# Patient Record
Sex: FEMALE | Race: WHITE | Employment: FULL TIME | URBAN - METROPOLITAN AREA
[De-identification: names, ages, dates, MRNs, and addresses within clinical notes are randomized per-mention and may not be internally consistent; named-entity substitution may affect disease eponyms.]

---

## 2017-07-26 ENCOUNTER — OFFICE VISIT (OUTPATIENT)
Dept: ORTHOPEDIC SURGERY | Age: 22
End: 2017-07-26

## 2017-07-26 VITALS
OXYGEN SATURATION: 99 % | TEMPERATURE: 98 F | HEART RATE: 92 BPM | BODY MASS INDEX: 32.52 KG/M2 | WEIGHT: 207.2 LBS | RESPIRATION RATE: 17 BRPM | DIASTOLIC BLOOD PRESSURE: 79 MMHG | SYSTOLIC BLOOD PRESSURE: 117 MMHG | HEIGHT: 67 IN

## 2017-07-26 DIAGNOSIS — M25.572 ACUTE LEFT ANKLE PAIN: ICD-10-CM

## 2017-07-26 DIAGNOSIS — M25.562 ACUTE PAIN OF LEFT KNEE: ICD-10-CM

## 2017-07-26 DIAGNOSIS — S83.92XA SPRAIN OF LEFT KNEE, UNSPECIFIED LIGAMENT, INITIAL ENCOUNTER: ICD-10-CM

## 2017-07-26 DIAGNOSIS — S93.412A SPRAIN OF CALCANEOFIBULAR LIGAMENT OF LEFT ANKLE, INITIAL ENCOUNTER: Primary | ICD-10-CM

## 2017-07-26 NOTE — PROGRESS NOTES
Chief Complaint   Patient presents with    Knee Pain     Left    Ankle Pain     Left    Hip Pain     Right

## 2017-07-26 NOTE — LETTER
NOTIFICATION RETURN TO WORK / SCHOOL 
 
7/26/2017 1:24 PM 
 
Ms. Mary Briscoe 
[address] [facility] [place] [address] [address] To Whom It May Concern: 
 
Kaylyn Rivas is currently under the care of [address] [place] [address] Pepito Navarro. She was seen in our office today. Please excuse her from work today, 7-26-17. She will return to work to work on 7-28-17 with the following restrictions: 
 Sedentary work only until her follow up appointment. If there are questions or concerns please have the patient contact our office.  
 
 
 
Sincerely, 
 
 
Brianna Marmolejo MD

## 2017-07-26 NOTE — PROCEDURES
RADIOGRAPHS: X-rays of the left knee, AP, lateral: No gross effusion. No fracture, subluxation, or dislocation. The medial and lateral joint spaces are well maintained. X-rays of the left ankle, AP, lateral, and oblique: Ankle joints are well maintained. No osteolytic or osteoblastic lesions are seen. No osteochondral defects. No significant bone spurs are seen to her foot, dorsal, plantar, medial or lateral. Normal alignment seen.

## 2017-07-26 NOTE — PROGRESS NOTES
AMBULATORY PROGRESS NOTE      Patient: Remberto Paredes             MRN: [id number]     SSN: [id number] Body mass index is 32.45 kg/(m^2). YOB: 1995     AGE: 22 y.o. EX: female    PCP: Alicia Page MD    IMPRESSION/DIAGNOSIS AND TREATMENT PLAN     DIAGNOSES  1. Sprain of calcaneofibular ligament of left ankle, initial encounter    2. Acute left ankle pain    3. Acute pain of left knee    4. Sprain of left knee, unspecified ligament, initial encounter        Orders Placed This Encounter    AMB SUPPLY ORDER    AMB SUPPLY ORDER    [56474] Knee 1-2V    [43089] Ankle Min 3V      Remberto Paredes understands her diagnoses and the proposed plan. Plan:    1) Order Custom Left Hinged Knee Brace  2) Order Left AS/AC Brace for Left Ankle    RTO - 3 weeks    HPI AND EXAMINATION     Mary Briscoe IS A 22 y.o. female who presents to my outpatient office complaining of left knee pain, right hip pain, and left ankle pain. On July 22, 2017, she was shopping at [employer] and stepped on a tomato that caused her to fall where she injured her right knee and ankle. 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 She rates her left sided pain at 5 to 8/10. Ms. Daniella Gamez reports not have any other complications. She works as a  at [employer]. Appearance: Alert, well appearing and pleasant patient who is in no distress, oriented to person, place/time, and who follows commands. This patient is accompanied in the office by her  self. Psychiatric: Affect and mood are appropriate.    Cardiovascular/Peripheral Vascular: Normal Pulses to each hand and foot           Knees:  Right        Gait: slow         Cutaneous: Skin intact, no abrasions, blisters, wounds, erythema        Effusion: Is not present        Crepitus:  no PF joint crepitus       Tenderness: Mild joint tenderness to MCL region   Alignment of Knee: no neutral when standing        ROM: full range of motion        Fullness or swelling: None to popliteal fossa region        Stability: No instability to anterior, posterior, varus, valgus stress testing        Trust: No varus trust with gait        Contractures: No Achilles or Gastrocnemius Contractures. Calf tenderness: Absent for calf or gastrocnemius muscle regions            Soft, supple, non tender, non taut lower extremity compartments  Extremities:   No embolic phenomena to the toes          No significant edema to the foot and or toes. Edema is not present to distal 1/3 tib/fib or ankle regions. Lower extremities are warm and appear well perfused    DVT: No evidence of DVT seen on examination at this time     No calf swelling, no tenderness to calf muscles  Lymphatic:  No Evidence of Lymphedema  Vascular: Medial Border of Tibia Region: Edema is not present        Pulses: Dorsalis Pedis &  Posterior Tibial Pulses : Palpable yes        Varicosities Lower Limbs : None noted . Neuro: Negative bilateral Straight leg raise (seated position)    See Musculoskeletal section for pertinent individual extremity examination    No abnormal hand/wrist, foot/ankle, or facial/neck tremors. Extensor mechanism is intact    HIP REGION: both  Gait: slow   Cutaneous: Skin intact, redness not present, erythema not present,drainage not present , rash not present  Visible swelling: not present  ROM: Normal IR, Normal ER   Normal FLEXION, Normal EXTENSION   Stability: none noted  Effusion: difficult to assess due to soft tissues  Crepitus: mild PF joint crepitus  Tenderness: to Groin not present, GT region not present, to distal femur not present   Fullness: Popliteal region not present  Deformity: not present       Contractures: No Achilles or Gastrocnemius Contractures.         Calf tenderness: Absent for calf or gastrocnemius muscle regions            Soft, supple, non tender, non taut lower extremity compartments  Extremities:   No embolic phenomena to the toes          No significant edema to the foot and or toes. Edema is not present to distal 1/3 tib/fib or ankle regions. Lower extremities are warm and appear well perfused    DVT: No evidence of DVT seen on examination at this time     No calf swelling, no tenderness to calf muscles  Lymphatic:  No Evidence of Lymphedema  Vascular: Medial Border of Tibia Region: Edema is not present        Pulses: Dorsalis Pedis &  Posterior Tibial Pulses : Palpable yes        Varicosities Lower Limbs : None noted . Neuro: Negative bilateral Straight leg raise (seated position)    See Musculoskeletal section for pertinent individual extremity examination    No abnormal hand/wrist, foot/ankle, or facial/neck tremors. ANKLE/FOOT left    Psychiatry: Alert, Oriented x 3; Speech normal in context and clarity,            Memory intact grossly, no involuntary movements - tremors, no dementia  Gait: slow  Tenderness: Tenderness to the left posterior tibial tendon and ATFL  Cutaneous: Mild lateral ankle swelling  Joint Motion: Decreased  Joint / Tendon Stability: No Ankle or Subtalar instability or joint laxity. No peroneal sublux ability or dislocation  Alignment:  Normal Foot Alignment and  Flexible  Neuro Motor/Sensory: NL/NL  Vascular: NL foot/ankle pulses  Lymphatics: No extremity lymphedema, No calf swelling, no tenderness to calf muscles. CHART REVIEW     History reviewed. No pertinent past medical history. Current Outpatient Prescriptions   Medication Sig    levonorgestrel (MIRENA) 20 mcg/24 hr (5 years) IUD 1 Each by IntraUTERine route once.  ibuprofen (MOTRIN) 800 mg tablet Take 1 Tab by mouth every eight (8) hours as needed for Pain. No current facility-administered medications for this visit. Allergies   Allergen Reactions    Ciprofloxacin Shortness of Breath     History reviewed. No pertinent surgical history.   Social History     Occupational History    Not on file. Social History Main Topics    Smoking status: Never Smoker    Smokeless tobacco: Never Used    Alcohol use Yes    Drug use: No    Sexual activity: Yes     History reviewed. No pertinent family history. REVIEW OF SYSTEMS : 8/30/2017  ALL BELOW ARE Negative except : SEE HPI     CONSTITUTIONAL: denies chills, fatigue, fever, weight change   PSYCH: denies anxiety, depression, irritability or mood swings   ENT: denies - headaches, hearing change, nasal congestion, oral lesions, or sore throat   HEM/ONC denies - bleeding problems, bruising, pallor or swollen lymph nodes   ENDO: denies hot flashes, polydipsia/polyuria or temperature intolerance   RESP: denies - cough, shortness of breath or wheezing   CV: denies - chest pain, edema or palpitations, BLAIR  GI: denies - abdominal pain, change in bowel habits, constipation, diarrhea or nausea/vomiting   : denies - dysuria, hematuria, incontinence, pelvic pain   MSK: denies  - See HPI. NEURO: denies - confusion, headaches, seizures or weakness   DERM: denies - dry skin, hair changes, rash or skin lesion changes  VASCULAR: Peripheral Vascular: No calf pain, vascular vein tenderness to calf pain              No calf throbbing, posterior knee throbbing pain     DIAGNOSTIC IMAGING     RADIOGRAPHS: X-rays of the left knee, AP, lateral: No gross effusion. No fracture, subluxation, or dislocation. The medial and lateral joint spaces are well maintained. X-rays of the left ankle, AP, lateral, and oblique: Ankle joints are well maintained. No osteolytic or osteoblastic lesions are seen. No osteochondral defects. No significant bone spurs are seen to her foot, dorsal, plantar, medial or lateral. Normal alignment seen. Written by Demetris Jaeger, as dictated by Delano Cobb MD. Dr. LONDON Victorino Covert, MD, confirm that all documentation is accurate.

## 2017-07-26 NOTE — PATIENT INSTRUCTIONS
Please follow up in 3 weeks. You are advised to contact us if your condition worsens. The provider has ordered a custom brace/orthotic for you. This will be customized and made for you by an outside facility. Please contact the orthotist at one of the below offices for your custom fitting and follow up in the office with the doctor or his physicians assistant 3 weeks after you have been wearing the brace. Nga Antonio at [facility]:     [address] [place] [address]   Phone: [phone]     Foot Sprain: Care Instructions  Your Care Instructions    A foot sprain occurs when you stretch or tear the ligaments around your foot. Ligaments are the tough tissues that connect one bone to another. A sprain can happen when you run, fall, or hit your toe against something. Sprains often happen when you jump or change direction quickly. This may occur when you play basketball, soccer, or other sports. Most foot sprains will get better with treatment at home. Follow-up care is a key part of your treatment and safety. Be sure to make and go to all appointments, and call your doctor if you are having problems. It's also a good idea to know your test results and keep a list of the medicines you take. How can you care for yourself at home? · Walk or put weight on your sprained foot as long as it does not hurt. · If your doctor gave you a splint or immobilizer, wear it as directed. If you were given crutches, use them as directed. · For the first 2 days after your injury, avoid hot showers, hot tubs, or hot packs. They may increase swelling. · Put ice or a cold pack on your foot for 10 to 20 minutes at a time to stop swelling. Try this every 1 to 2 hours for 3 days (when you are awake) or until the swelling goes down. Put a thin cloth between the ice pack and your skin. Keep your splint dry.   · After 2 or 3 days, if your swelling is gone, put a heating pad (set on low) or a warm cloth on your foot. Some doctors suggest that you go back and forth between hot and cold treatments. · Prop up your foot on a pillow when you ice it or anytime you sit or lie down. Try to keep it above the level of your heart. This will help reduce swelling. · Take pain medicines exactly as directed. ¨ If the doctor gave you a prescription medicine for pain, take it as prescribed. ¨ If you are not taking a prescription pain medicine, ask your doctor if you can take an over-the-counter medicine. · Do any exercises that your doctor or physical therapist suggests. · Return to your usual exercise gradually as you feel better. When should you call for help? Call your doctor now or seek immediate medical care if:  · You have increased or severe pain. · Your toes are cool or pale or change color. · Your wrap or splint feels too tight. · You have signs of a blood clot, such as:  ¨ Pain in your calf, back of the knee, thigh, or groin. ¨ Redness and swelling in your leg or groin. · You have tingling, weakness, or numbness in your leg or foot. Watch closely for changes in your health, and be sure to contact your doctor if:  · You cannot put any weight on your foot. · You get a fever. · You do not get better as expected. Where can you learn more? Go to http://fabian-zeny.info/. Enter C276 in the search box to learn more about \"Foot Sprain: Care Instructions. \"  Current as of: March 21, 2017  Content Version: 11.3  © 3875-5368 [employer]. Care instructions adapted under license by [employer] (which disclaims liability or warranty for this information). If you have questions about a medical condition or this instruction, always ask your healthcare professional. [place] [address] any warranty or liability for your use of this information. Ankle Sprain: Care Instructions  Your Care Instructions    An ankle sprain can happen when you twist your ankle.  The ligaments that support the ankle can get stretched and torn. Often the ankle is swollen and painful. Ankle sprains may take from several weeks to several months to heal. Usually, the more pain and swelling you have, the more severe your ankle sprain is and the longer it will take to heal. You can heal faster and regain strength in your ankle with good home treatment. It is very important to give your ankle time to heal completely, so that you do not easily hurt your ankle again. Follow-up care is a key part of your treatment and safety. Be sure to make and go to all appointments, and call your doctor if you are having problems. It's also a good idea to know your test results and keep a list of the medicines you take. How can you care for yourself at home? · Prop up your foot on pillows as much as possible for the next 3 days. Try to keep your ankle above the level of your heart. This will help reduce the swelling. · Follow your doctor's directions for wearing a splint or elastic bandage. Wrapping the ankle may help reduce or prevent swelling. · Your doctor may give you a splint, a brace, an air stirrup, or another form of ankle support to protect your ankle until it is healed. Wear it as directed while your ankle is healing. Do not remove it unless your doctor tells you to. After your ankle has healed, ask your doctor whether you should wear the brace when you exercise. · Put ice or cold packs on your injured ankle for 10 to 20 minutes at a time. Try to do this every 1 to 2 hours for the next 3 days (when you are awake) or until the swelling goes down. Put a thin cloth between the ice and your skin. · You may need to use crutches until you can walk without pain. If you do use crutches, try to bear some weight on your injured ankle if you can do so without pain. This helps the ankle heal.  · Take pain medicines exactly as directed.   ¨ If the doctor gave you a prescription medicine for pain, take it as prescribed. ¨ If you are not taking a prescription pain medicine, ask your doctor if you can take an over-the-counter medicine. · If you have been given ankle exercises to do at home, do them exactly as instructed. These can promote healing and help prevent lasting weakness. When should you call for help? Call your doctor now or seek immediate medical care if:  · Your pain is getting worse. · Your swelling is getting worse. · Your splint feels too tight or you are unable to loosen it. Watch closely for changes in your health, and be sure to contact your doctor if:  · You are not getting better after 1 week. Where can you learn more? Go to http://fabian-zeny.info/. Enter O141 in the search box to learn more about \"Ankle Sprain: Care Instructions. \"  Current as of: March 21, 2017  Content Version: 11.3  © 7892-0700 [employer]. Care instructions adapted under license by [employer] (which disclaims liability or warranty for this information). If you have questions about a medical condition or this instruction, always ask your healthcare professional. [address] any warranty or liability for your use of this information.

## 2017-08-30 ENCOUNTER — OFFICE VISIT (OUTPATIENT)
Dept: ORTHOPEDIC SURGERY | Age: 22
End: 2017-08-30

## 2017-08-30 VITALS
RESPIRATION RATE: 16 BRPM | BODY MASS INDEX: 32.3 KG/M2 | OXYGEN SATURATION: 99 % | HEIGHT: 67 IN | DIASTOLIC BLOOD PRESSURE: 78 MMHG | TEMPERATURE: 96.7 F | WEIGHT: 205.8 LBS | HEART RATE: 79 BPM | SYSTOLIC BLOOD PRESSURE: 121 MMHG

## 2017-08-30 DIAGNOSIS — S93.412D SPRAIN OF CALCANEOFIBULAR LIGAMENT OF LEFT ANKLE, SUBSEQUENT ENCOUNTER: ICD-10-CM

## 2017-08-30 DIAGNOSIS — S83.002D SUBLUXATION OF PATELLA, LEFT, SUBSEQUENT ENCOUNTER: Primary | ICD-10-CM

## 2017-08-30 NOTE — MR AVS SNAPSHOT
Visit Information Date & Time Provider Department Dept. Phone Encounter #  
 8/30/2017  8:00 AM Blaine Singh, [address] [facility] [phone] [id number] Upcoming Health Maintenance Date Due Hepatitis A Peds Age 1-18 (1 of 2 - Standard Series) 9/5/1997 DTaP/Tdap/Td series (1 - Tdap) 9/5/2003 HPV AGE 9Y-26Y (1 of 3 - Female 3 Dose Series) 9/5/2007 Pneumococcal 19-64 Medium Risk (1 of 1 - PPSV23) 9/5/2015 INFLUENZA AGE 9 TO ADULT 8/1/2017 Allergies as of 8/30/2017  Review Complete On: 8/30/2017 By: Blaine Singh MD  
  
 Severity Noted Reaction Type Reactions Ciprofloxacin  02/17/2015    Shortness of Breath Current Immunizations  Never Reviewed No immunizations on file. Not reviewed this visit You Were Diagnosed With   
  
 Codes Comments Subluxation of patella, left, subsequent encounter    -  Primary ICD-10-CM: I05.692P ICD-9-CM: V54.89, 836.3 Sprain of calcaneofibular ligament of left ankle, subsequent encounter     ICD-10-CM: X73.009E ICD-9-CM: V58.89, 845.02 Vitals BP Pulse Temp Resp Height(growth percentile) Weight(growth percentile) 121/78 79 96.7 °F (35.9 °C) (Oral) 16 5' 7\" (1.702 m) 205 lb 12.8 oz (93.4 kg) SpO2 BMI OB Status Smoking Status 99% 32.23 kg/m2 IUD Never Smoker BMI and BSA Data Body Mass Index Body Surface Area  
 32.23 kg/m 2 2.1 m 2 Preferred Pharmacy Pharmacy Name Phone [facility] [address] [place] [address], [address] [place] [address] [phone] Your Updated Medication List  
  
   
This list is accurate as of: 8/30/17  8:28 AM.  Always use your most recent med list.  
  
  
  
  
 ibuprofen 800 mg tablet Commonly known as:  MOTRIN Take 1 Tab by mouth every eight (8) hours as needed for Pain.  
  
 levonorgestrel 20 mcg/24 hr (5 years) IUD Commonly known as:  MIRENA  
1 Each by IntraUTERine route once. Patient Instructions Please follow up after MRI. You are advised to contact us if your condition worsens. An MRI or CT has been ordered for you. A [employer] will be contacting you to schedule the appointment as soon as it has been approved with your insurance company. Please schedule an appointment to follow up with the doctor or the physicians assistant after the MRI or CT has been conducted. Knee: Exercises Your Care Instructions Here are some examples of exercises for your knee. Start each exercise slowly. Ease off the exercise if you start to have pain. Your doctor or physical therapist will tell you when you can start these exercises and which ones will work best for you. How to do the exercises [facility] 1. Sit with your leg straight and supported on the floor or a firm bed. (If you feel discomfort in the front or back of your knee, place a small towel roll under your knee.) 2. Tighten the muscles on top of your thigh by pressing the back of your knee flat down to the floor. (If you feel discomfort under your kneecap, place a small towel roll under your knee.) 3. Hold for about 6 seconds, then rest for up to 10 seconds. 4. Do 8 to 12 repetitions several times a day. Straight-leg raises to the front 1. Lie on your back with your good knee bent so that your foot rests flat on the floor. Your injured leg should be straight. Make sure that your low back has a normal curve. You should be able to slip your flat hand in between the floor and the small of your back, with your palm touching the floor and your back touching the back of your hand. 2. Tighten the thigh muscles in the injured leg by pressing the back of your knee flat down to the floor. Hold your knee straight. 3. Keeping the thigh muscles tight, lift your injured leg up so that your heel is about 12 inches off the floor. Hold for about 6 seconds and then lower slowly. 4. Do 8 to 12 repetitions, 3 times a day. Straight-leg raises to the outside 1. Lie on your side, with your injured leg on top. 2. Tighten the front thigh muscles of your injured leg to keep your knee straight. 3. Keep your hip and your leg straight in line with the rest of your body, and keep your knee pointing forward. Do not drop your hip back. 4. Lift your injured leg straight up toward the ceiling, about 12 inches off the floor. Hold for about 6 seconds, then slowly lower your leg. 5. Do 8 to 12 repetitions. Straight-leg raises to the back 1. Lie on your stomach, and lift your leg straight up behind you (toward the ceiling). 2. Lift your toes about 6 inches off the floor, hold for about 6 seconds, then lower slowly. 3. Do 8 to 12 repetitions. Straight-leg raises to the inside 1. Lie on the side of your body with the injured leg. 2. You can either prop your other (good) leg up on a chair, or you can bend your good knee and put that foot in front of your injured knee. Do not drop your hip back. 3. Tighten the muscles on the front of your thigh to straighten your injured knee. 4. Keep your kneecap pointing forward, and lift your whole leg up toward the ceiling about 6 inches. Hold for about 6 seconds, then lower slowly. 5. Do 8 to 12 repetitions. Heel dig bridging 1. Lie on your back with both knees bent and your ankles bent so that only your heels are digging into the floor. Your knees should be bent about 90 degrees. 2. Then push your heels into the floor, squeeze your buttocks, and lift your hips off the floor until your shoulders, hips, and knees are all in a straight line. 3. Hold for about 6 seconds as you continue to breathe normally, and then slowly lower your hips back down to the floor and rest for up to 10 seconds. 4. Do 8 to 12 repetitions. Hamstring curls 1. Lie on your stomach with your knees straight. If your kneecap is uncomfortable, roll up a washcloth and put it under your leg just above your kneecap. 2. Lift the foot of your injured leg by bending the knee so that you bring the foot up toward your buttock. If this motion hurts, try it without bending your knee quite as far. This may help you avoid any painful motion. 3. Slowly lower your leg back to the floor. 4. Do 8 to 12 repetitions. 5. With permission from your doctor or physical therapist, you may also want to add a cuff weight to your ankle (not more than 5 pounds). With weight, you do not have to lift your leg more than 12 inches to get a hamstring workout. Shallow standing knee bends Note: Do this exercise only if you have very little pain; if you have no clicking, locking, or giving way if you have an injured knee; and if it does not hurt while you are doing 8 to 12 repetitions. 1. Stand with your hands lightly resting on a counter or chair in front of you. Put your feet shoulder-width apart. 2. Slowly bend your knees so that you squat down like you are going to sit in a chair. Make sure your knees do not go in front of your toes. 3. Lower yourself about 6 inches. Your heels should remain on the floor at all times. 4. Rise slowly to a standing position. Heel raises 1. Stand with your feet a few inches apart, with your hands lightly resting on a counter or chair in front of you. 2. Slowly raise your heels off the floor while keeping your knees straight. 3. Hold for about 6 seconds, then slowly lower your heels to the floor. 4. Do 8 to 12 repetitions several times during the day. Follow-up care is a key part of your treatment and safety. Be sure to make and go to all appointments, and call your doctor if you are having problems. It's also a good idea to know your test results and keep a list of the medicines you take. Where can you learn more? Go to http://fabian-zeny.info/. Enter K456 in the search box to learn more about \"Knee: Exercises. \" Current as of: March 21, 2017 Content Version: 11.3 © 4418-2565 Healthwise, [employer]. Care instructions adapted under license by [employer] (which disclaims liability or warranty for this information). If you have questions about a medical condition or this instruction, always ask your healthcare professional. [address] any warranty or liability for your use of this information. Introducing [facility]! Carie Dhillon introduces [employer] patient portal. Now you can access parts of your medical record, email your doctor's office, and request medication refills online. 1. In your internet browser, go to [email or web address] 2. Click on the First Time User? Click Here link in the Sign In box. You will see the New Member Sign Up page. 3. Enter your [employer] Access Code exactly as it appears below. You will not need to use this code after youve completed the sign-up process. If you do not sign up before the expiration date, you must request a new code. · [employer] Access Code: [email or web address] Expires: 10/24/2017 11:03 AM 
 
4. Enter the last four digits of your Social Security Number (xxxx) and Date of Birth (mm/dd/yyyy) as indicated and click Submit. You will be taken to the next sign-up page. 5. Create a [employer] ID. This will be your [employer] login ID and cannot be changed, so think of one that is secure and easy to remember. 6. Create a [employer] password. You can change your password at any time. 7. Enter your Password Reset Question and Answer. This can be used at a later time if you forget your password. 8. Enter your e-mail address. You will receive e-mail notification when new information is available in [address] 9. Click Sign Up. You can now view and download portions of your medical record. 10. Click the Download Summary menu link to download a portable copy of your medical information.  
 
If you have questions, please visit the Frequently Asked Questions section of the [employer]. Remember, [facility] is NOT to be used for urgent needs. For medical emergencies, dial 911. Now available from your iPhone and Android! Please provide this summary of care documentation to your next provider. Your primary care clinician is listed as Dayanna Almanza. If you have any questions after today's visit, please call [phone].

## 2017-08-30 NOTE — LETTER
NOTIFICATION RETURN TO WORK / SCHOOL 
 
8/30/2017 8:24 AM 
 
Ms. Mary Briscoe 
[address] [facility] [address] [address] To Whom It May Concern: 
 
Kari August is currently under the care of [address] [place] [address] Pepito Navarro. Please allow Ms. Ellis Padilla to return to work on [employer] for 4 weeks with a limit to lift up to 15 pounds. If there are questions or concerns please have the patient contact our office.  
 
 
 
Sincerely, 
 
 
Jeffery Robin MD

## 2017-08-30 NOTE — PATIENT INSTRUCTIONS
Please follow up after MRI. You are advised to contact us if your condition worsens. An MRI or CT has been ordered for you. A [employer] will be contacting you to schedule the appointment as soon as it has been approved with your insurance company. Please schedule an appointment to follow up with the doctor or the physicians assistant after the MRI or CT has been conducted. Knee: Exercises  Your Care Instructions  Here are some examples of exercises for your knee. Start each exercise slowly. Ease off the exercise if you start to have pain. Your doctor or physical therapist will tell you when you can start these exercises and which ones will work best for you. How to do the exercises  Quad sets    1. Sit with your leg straight and supported on the floor or a firm bed. (If you feel discomfort in the front or back of your knee, place a small towel roll under your knee.)  2. Tighten the muscles on top of your thigh by pressing the back of your knee flat down to the floor. (If you feel discomfort under your kneecap, place a small towel roll under your knee.)  3. Hold for about 6 seconds, then rest for up to 10 seconds. 4. Do 8 to 12 repetitions several times a day. Straight-leg raises to the front    1. Lie on your back with your good knee bent so that your foot rests flat on the floor. Your injured leg should be straight. Make sure that your low back has a normal curve. You should be able to slip your flat hand in between the floor and the small of your back, with your palm touching the floor and your back touching the back of your hand. 2. Tighten the thigh muscles in the injured leg by pressing the back of your knee flat down to the floor. Hold your knee straight. 3. Keeping the thigh muscles tight, lift your injured leg up so that your heel is about 12 inches off the floor. Hold for about 6 seconds and then lower slowly. 4. Do 8 to 12 repetitions, 3 times a day.   Straight-leg raises to the outside    1. Lie on your side, with your injured leg on top. 2. Tighten the front thigh muscles of your injured leg to keep your knee straight. 3. Keep your hip and your leg straight in line with the rest of your body, and keep your knee pointing forward. Do not drop your hip back. 4. Lift your injured leg straight up toward the ceiling, about 12 inches off the floor. Hold for about 6 seconds, then slowly lower your leg. 5. Do 8 to 12 repetitions. Straight-leg raises to the back    1. Lie on your stomach, and lift your leg straight up behind you (toward the ceiling). 2. Lift your toes about 6 inches off the floor, hold for about 6 seconds, then lower slowly. 3. Do 8 to 12 repetitions. Straight-leg raises to the inside    1. Lie on the side of your body with the injured leg. 2. You can either prop your other (good) leg up on a chair, or you can bend your good knee and put that foot in front of your injured knee. Do not drop your hip back. 3. Tighten the muscles on the front of your thigh to straighten your injured knee. 4. Keep your kneecap pointing forward, and lift your whole leg up toward the ceiling about 6 inches. Hold for about 6 seconds, then lower slowly. 5. Do 8 to 12 repetitions. Heel dig bridging    1. Lie on your back with both knees bent and your ankles bent so that only your heels are digging into the floor. Your knees should be bent about 90 degrees. 2. Then push your heels into the floor, squeeze your buttocks, and lift your hips off the floor until your shoulders, hips, and knees are all in a straight line. 3. Hold for about 6 seconds as you continue to breathe normally, and then slowly lower your hips back down to the floor and rest for up to 10 seconds. 4. Do 8 to 12 repetitions. Hamstring curls    1. Lie on your stomach with your knees straight. If your kneecap is uncomfortable, roll up a washcloth and put it under your leg just above your kneecap.   2. Lift the foot of your injured leg by bending the knee so that you bring the foot up toward your buttock. If this motion hurts, try it without bending your knee quite as far. This may help you avoid any painful motion. 3. Slowly lower your leg back to the floor. 4. Do 8 to 12 repetitions. 5. With permission from your doctor or physical therapist, you may also want to add a cuff weight to your ankle (not more than 5 pounds). With weight, you do not have to lift your leg more than 12 inches to get a hamstring workout. Shallow standing knee bends    Note: Do this exercise only if you have very little pain; if you have no clicking, locking, or giving way if you have an injured knee; and if it does not hurt while you are doing 8 to 12 repetitions. 1. Stand with your hands lightly resting on a counter or chair in front of you. Put your feet shoulder-width apart. 2. Slowly bend your knees so that you squat down like you are going to sit in a chair. Make sure your knees do not go in front of your toes. 3. Lower yourself about 6 inches. Your heels should remain on the floor at all times. 4. Rise slowly to a standing position. Heel raises    1. Stand with your feet a few inches apart, with your hands lightly resting on a counter or chair in front of you. 2. Slowly raise your heels off the floor while keeping your knees straight. 3. Hold for about 6 seconds, then slowly lower your heels to the floor. 4. Do 8 to 12 repetitions several times during the day. Follow-up care is a key part of your treatment and safety. Be sure to make and go to all appointments, and call your doctor if you are having problems. It's also a good idea to know your test results and keep a list of the medicines you take. Where can you learn more? Go to http://fabian-zeny.info/. Enter G045 in the search box to learn more about \"Knee: Exercises. \"  Current as of: March 21, 2017  Content Version: 11.3  © 9392-0367 [employer], [facility].  Care instructions adapted under license by [employer] (which disclaims liability or warranty for this information). If you have questions about a medical condition or this instruction, always ask your healthcare professional. [address] any warranty or liability for your use of this information.

## 2017-08-30 NOTE — PROGRESS NOTES
AMBULATORY PROGRESS NOTE      Patient: Al July             MRN: [id number]     SSN: [id number] Body mass index is 32.23 kg/(m^2). YOB: 1995     AGE: 22 y.o. EX: female    PCP: Ovi Koch MD    IMPRESSION/DIAGNOSIS AND TREATMENT PLAN     DIAGNOSES  1. Subluxation of patella, left, subsequent encounter    2. Sprain of calcaneofibular ligament of left ankle, subsequent encounter        Orders Placed This Encounter    MRI KNEE LT [address] [address] understands her diagnoses and the proposed plan. As such, in this individual, who is a 41-year-old, healthy female, who had a work-related, twisting injury to her left knee, who has medial joint line tenderness and a positive Trina's sign and has discomfort when she goes up and down steps and discomfort when she stands and walks for any prolonged period of time, recommendation strongly is for an MRI of her left knee. This MRI of the left knee is definitely an item of medical necessity. This will allow me to assess the medial meniscus, as well as the MCL, but more importantly, the meniscal capsular junction, is what I am looking for to assess on this left knee. Plan:    1) D/C Left Hinged Knee Brace and Left Ankle AS/AC Brace  2) Recommended OTC Knee Brace  3) Provided Theraband for Left Ankle  4) MRI - Left Knee  5) Work Note Provided for Light Duty    RTO - After MRI    HPI AND EXAMINATION     Mary Briscoe IS A 22 y.o. female who presents to my outpatient office for follow up of a sprain of calcaneofibular ligament of the left ankle, acute left knee pain, and a sprain of the left knee. On July 22, 2017, she was shopping at [employer] and stepped on a tomato that caused her to fall where she injured her right knee and ankle. 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  At last visit, I ordered a custom left hinged knee brace, and a left AS/AC brace for the left ankle. My overall impression is subluxation of the left patella with a history of left patellofemoral injury in the past while in high school, as well as a work-related ankle sprain. Other diagnoses include [id number], posterior tibial tendinitis on this left side, as well as a left knee sprain, left patellofemoral syndrome, left medial joint line tenderness, and left ankle sprain resolving, and left posterior tibial tendinitis, improved. All are work-related injuries. The patient presents to the office today wearing sandals, her left knee brace, and the left AS/AC brace. She notes there is pain on the medial side of her knee. Pt has not noticed any swelling. Pt denies any elicited pain when going up or down steps. She reports that there is some crepitus when flexing and extending the left knee. She notes some pain when crossing her left leg over, no pain when crossing the right leg. Treatment options and mechanism of injury have been discussed with the Pt. She understands the options provided and is contempt with the options. Appearance: Alert, well appearing and pleasant patient who is in no distress, oriented to person, place/time, and who follows commands. This patient is accompanied in the office by her  self. Psychiatric: Affect and mood are appropriate. Cardiovascular/Peripheral Vascular: Normal Pulses to each hand and foot      Knees:  Right                         Gait: slow                          Cutaneous: Skin intact, no abrasions, blisters, wounds, erythema                        Effusion: Is not present                        Crepitus:  no PF joint crepitus                        Tenderness: Mild joint tenderness to Medial joint line/MCL region, Trina +.                         Alignment of Knee: no neutral when standing                        ROM: full range of motion                        Fullness or swelling: None to popliteal fossa region Stability: No instability to anterior, posterior, varus, valgus stress testing                        Trust: No varus trust with gait                        Contractures: No Achilles or Gastrocnemius Contractures. Calf tenderness: Absent for calf or gastrocnemius muscle regions                                 Soft, supple, non tender, non taut lower extremity compartments  Extremities:   No embolic phenomena to the toes                                               No significant edema to the foot and or toes. Edema is not present to distal 1/3 tib/fib or ankle regions. Lower extremities are warm and appear well perfused                                              DVT: No evidence of DVT seen on examination at this time                                                                    No calf swelling, no tenderness to calf muscles  Lymphatic:  No Evidence of Lymphedema  Vascular: Medial Border of Tibia Region: Edema is not present                             Pulses: Dorsalis Pedis &  Posterior Tibial Pulses : Palpable yes                             Varicosities Lower Limbs : None noted . Neuro: Negative bilateral Straight leg raise (seated position)                         See Musculoskeletal section for pertinent individual extremity examination                         No abnormal hand/wrist, foot/ankle, or facial/neck tremors. Extensor mechanism is intact     ANKLE/FOOT left     Psychiatry: Alert, Oriented x 3; Speech normal in context and clarity, Memory intact grossly, no involuntary movements - tremors, no dementia  Gait: slow  Tenderness: Mild Tenderness to the left posterior tibial tendon and ATFL  Cutaneous: Mild, mild  lateral ankle swelling AL  Joint Motion: WNL  Joint / Tendon Stability: No Ankle or Subtalar instability or joint laxity. No peroneal sublux ability or dislocation  Alignment:  Normal Foot Alignment and  Flexible  Neuro Motor/Sensory: NL/NL  Vascular: NL foot/ankle pulses  Lymphatics: No extremity lymphedema, No calf swelling, no tenderness to calf muscles. CHART REVIEW     History reviewed. No pertinent past medical history. Current Outpatient Prescriptions   Medication Sig    levonorgestrel (MIRENA) 20 mcg/24 hr (5 years) IUD 1 Each by IntraUTERine route once.  ibuprofen (MOTRIN) 800 mg tablet Take 1 Tab by mouth every eight (8) hours as needed for Pain. No current facility-administered medications for this visit. Allergies   Allergen Reactions    Ciprofloxacin Shortness of Breath     History reviewed. No pertinent surgical history. Social History     Occupational History    Not on file. Social History Main Topics    Smoking status: Never Smoker    Smokeless tobacco: Never Used    Alcohol use Yes    Drug use: No    Sexual activity: Yes     History reviewed. No pertinent family history. REVIEW OF SYSTEMS : 8/30/2017  ALL BELOW ARE Negative except : SEE HPI       Constitutional: Negative for fever, chills and weight loss. Neg Weigh Loss  Cardiovascular: Negative for chest pain, claudication and leg swelling. SOB, BLAIR   Gastrointestinal: Negative for  pain, N/V/D/C, Blood in stool or urine,dysuria, hematuria,        Incontinence, pelvic pain  Musculoskeletal: see HPI. Neurological: Negative for dizziness and weakness. Negative for headaches,Visual Changes, Confusion, Seizures,   Psychiatric/Behavioral: Negative for depression, memory loss and substance abuse. Extremities:  Negative for  hair changes, rash or skin lesion changes. Hematologic: Negative for Bleeding problems, bruising, pallor or swollen lymph nodes.   Peripheral Vascular: No calf pain, vascular vein tenderness to calf pain              No calf throbbing, posterior knee throbbing pain    DIAGNOSTIC IMAGING     No notes on file    Written by Hector Onofre, as dictated by Erin Naranjo MD. I, , Erin Naranjo MD, confirm that all documentation is accurate.

## 2017-09-08 ENCOUNTER — HOSPITAL ENCOUNTER (OUTPATIENT)
Age: 22
Discharge: HOME | End: 2017-09-08
Attending: ORTHOPAEDIC SURGERY
Payer: COMMERCIAL

## 2017-09-08 DIAGNOSIS — S83.002D SUBLUXATION OF PATELLA, LEFT, SUBSEQUENT ENCOUNTER: ICD-10-CM

## 2017-09-08 PROCEDURE — 73721 MRI JNT OF LWR EXTRE W/O DYE: CPT

## 2017-10-02 ENCOUNTER — HOSPITAL ENCOUNTER (OUTPATIENT)
Dept: PHYSICAL THERAPY | Age: 22
Discharge: HOME | End: 2017-10-02
Payer: COMMERCIAL

## 2017-10-02 ENCOUNTER — OFFICE VISIT (OUTPATIENT)
Dept: ORTHOPEDIC SURGERY | Age: 22
End: 2017-10-02

## 2017-10-02 VITALS
TEMPERATURE: 97.1 F | WEIGHT: 209 LBS | BODY MASS INDEX: 32.8 KG/M2 | HEART RATE: 83 BPM | SYSTOLIC BLOOD PRESSURE: 127 MMHG | DIASTOLIC BLOOD PRESSURE: 68 MMHG | HEIGHT: 67 IN

## 2017-10-02 DIAGNOSIS — M76.822 POSTERIOR TIBIAL TENDINITIS OF LEFT LOWER EXTREMITY: Primary | ICD-10-CM

## 2017-10-02 DIAGNOSIS — M25.562 LEFT KNEE PAIN, UNSPECIFIED CHRONICITY: ICD-10-CM

## 2017-10-02 PROCEDURE — 97110 THERAPEUTIC EXERCISES: CPT

## 2017-10-02 PROCEDURE — 97161 PT EVAL LOW COMPLEX 20 MIN: CPT

## 2017-10-02 NOTE — PROGRESS NOTES
[facility]  [address]  [place], [address]  [phone] fax    Plan of Care/ Statement of Necessity for Physical Therapy Services    Patient name: Amber Burleson Start of Care: 10/2/2017   Referral source: Carla Singh MD : 1995    Medical Diagnosis: Left knee pain [M25.562]   Onset Date: 2017    Treatment Diagnosis: L quad tendinosis   Prior Hospitalization: see medical history Provider#: [id number]   Medications: Verified on Patient summary List    Comorbidities: Arthritis, Latex Allergy   Prior Level of Function: The patient states that she had L knee pain in a chronic standpoint, but noted it became worse following the fall on her knee in July limiting stairs and squatting. The Plan of Care and following information is based on the information from the initial evaluation. Assessment/ key information: The patient is a 25year old female with a chief complaint of left knee and ankle pain that initiated in July when she stated she slipped on a tomato that was on a the floor and fell on her left knee. She states her ankle has also been sore. About 3 weeks ago, she noted falling again due to her ankle (L) giving way and she heard a pop and it became swollen. Since that time, she has been taking it easy per MD instruction, She has had radiographs which were negative, and a recent MRI of her L knee which showed quad tendinosis with associated impairments consisting of pain, decreased ROM, decreased flexibility, decreased strength, and limited ADL ease. The patient will benefit from skilled PT in order to address the aforementioned impairments.      Evaluation Complexity History MEDIUM  Complexity : 1-2 comorbidities / personal factors will impact the outcome/ POC ; Examination MEDIUM Complexity : 3 Standardized tests and measures addressing body structure, function, activity limitation and / or participation in recreation ;Presentation LOW Complexity : Stable, uncomplicated  ;Clinical Decision Making MEDIUM Complexity : FOTO score of 26-74  Overall Complexity Rating: LOW   Problem List: pain affecting function, decrease strength, decrease ADL/ functional abilitiies, decrease activity tolerance and decrease flexibility/ joint mobility   Treatment Plan may include any combination of the following: Therapeutic exercise, Therapeutic activities, Neuromuscular re-education, Physical agent/modality, Manual therapy and Patient education  Patient / Family readiness to learn indicated by: asking questions, trying to perform skills and interest  Persons(s) to be included in education: patient (P)  Barriers to Learning/Limitations: None  Patient Goal (s): strengthen knee  Patient Self Reported Health Status: good  Rehabilitation Potential: good    Short Term Goals: To be accomplished in 2 weeks:   1. The patient will be independent and compliant with HEP to maximize therapeutic benefit. 2. The patient will improve quad strength to 5/5 MMT to maximize stability during ADLs. Long Term Goals: To be accomplished in 4 weeks:   1. The patient will improve FOTO score to 60 to maximize quality of life. 2. The patient will display 8\" step up without evidence of instability or compensation for community negotiation. 3. The patient will display effective squat form with no greater than 1/10 pain in order to improve work efficiency. 4. The patient will improve hip ABD/ER hip strength to 4+/5 MMT to improve stability of knee during squatting to reduce knee pain. Frequency / Duration: Patient to be seen 2 times per week for 4 weeks.     Patient/ Caregiver education and instruction: Diagnosis, prognosis, self care, activity modification and exercises   [x]  Plan of care has been reviewed with ROSENDO Brunner, PT 10/2/2017 12:47 PM    ________________________________________________________________________    I certify that the above Therapy Services are being furnished while the patient is under my care. I agree with the treatment plan and certify that this therapy is necessary.     [de-identified] Signature:____________________  Date:____________Time: _________    Please sign and return to [facility] [address] [address] [address]  [place], [address]  [phone] fax

## 2017-10-02 NOTE — PROGRESS NOTES
AMBULATORY PROGRESS NOTE      Patient: Sherryle Marsh             MRN: [id number]     SSN: [id number] Body mass index is 32.73 kg/(m^2). YOB: 1995     AGE: 25 y.o. EX: female    PCP: Eloina Olea MD    IMPRESSION/DIAGNOSIS AND TREATMENT PLAN     DIAGNOSES  1. Posterior tibial tendinitis of left lower extremity    2. Left knee pain, unspecified chronicity        Orders Placed This Encounter    REFERRAL TO PHYSICAL THERAPY    [address] [place] [facility]      Sherryle Marsh understands her diagnoses and the proposed plan. Plan:    1) Referral for Physical Therapy: PF instability, Quadricept tendonopathy  2) Continue HEP  3) Referral Dr. Rizwana Rosas: please look at MRI: to my viewing: ACL looks diminutive  4) Work Note: Light Duty, no lifting or pulling until further notice. RTO - 3 weeks     HPI AND EXAMINATION     Mary Briscoe IS A 25 y.o. female who presents to my outpatient office for follow up of an injury to the left knee. At last visit, I recommended to D/C the left hinged knee brace, use OTC Knee brace, and ordered an MRI. The patient presents to the office today for follow up of her left knee MRI. She reports that she has felt the knee pop. The patient notes some instability to the left knee. The patient reports that on 9/1/2017, she twisted her left ankle while unloading a van. She notes that she felt a pop, and pain along the inner portion of her left foot. She visited [facility] where XR were requested. XR were negative for fractures according to the patient. She experiences discomfort, and pain along the medial portion of the left ankle. She points to the posterior tibialis tendon. She has an orthotics that were provided earlier, she notes that they have helped. Appearance: Alert, well appearing and pleasant patient who is in no distress, oriented to person, place/time, and who follows commands. This patient is accompanied in the office by her  self. Psychiatric: Affect and mood are appropriate. Cardiovascular/Peripheral Vascular: Normal Pulses to each hand and foot      Knees:  LEFT                         Gait: slow                          Cutaneous: Skin intact, no abrasions, blisters, wounds, erythema                        Effusion: Is not present                        Crepitus:  no PF joint crepitus                        Tenderness: Mild joint tenderness to Medial joint line/MCL region, Trina - today. Mild tenderness to the Quadricept tendon               Mild tenderness over the PF ligament                         Alignment of Knee: no neutral when standing                        ROM: full range of motion                        Fullness or swelling: None to popliteal fossa region                        Stability: No instability to anterior, posterior, varus, valgus stress testing                        Trust: No varus trust with gait                        Contractures: No Achilles or Gastrocnemius Contractures.                         Calf tenderness: Absent for calf or gastrocnemius muscle regions                                             Soft, supple, non tender, non taut lower extremity compartments  Extremities:   No embolic phenomena to the toes                           No significant edema to the foot and or toes.                         KBUGF is not present to distal 1/3 tib/fib or ankle regions.                         Lower extremities are warm and appear well perfused                          DVT: No evidence of DVT seen on examination at this time                          No calf swelling, no tenderness to calf muscles  Lymphatic:  No Evidence of Lymphedema  Vascular: Medial Border of Tibia Region: Edema is not present                   Pulses: Dorsalis Pedis &  Posterior Tibial Pulses : Palpable yes                   Varicosities Lower Limbs : None noted .   Neuro: Negative bilateral Straight leg raise (seated position)               See Musculoskeletal section for pertinent individual extremity examination               No abnormal hand/wrist, foot/ankle, or facial/neck tremors.               Extensor mechanism is intact    ANKLE/FOOT left      Psychiatry: Alert, Oriented x 3; Speech normal in context and clarity,             Memory intact grossly, no involuntary movements - tremors, no dementia  Gait: slow  Tenderness: Mild Tenderness to the left posterior tibial tendon to the insertional navicular tuberosity and ATFL  Cutaneous: Mild to medial aspect of ankle  Joint Motion: WNL  Joint / Tendon Stability: No Ankle or Subtalar instability or joint laxity.                                            No peroneal sublux ability or dislocation  Alignment:  Normal Foot Alignment and  Flexible  Neuro Motor/Sensory: NL/NL  Vascular: NL foot/ankle pulses  Lymphatics: No extremity lymphedema, No calf swelling, no tenderness to calf muscles. CHART REVIEW     History reviewed. No pertinent past medical history. Current Outpatient Prescriptions   Medication Sig    levonorgestrel (MIRENA) 20 mcg/24 hr (5 years) IUD 1 Each by IntraUTERine route once.  ibuprofen (MOTRIN) 800 mg tablet Take 1 Tab by mouth every eight (8) hours as needed for Pain. No current facility-administered medications for this visit. Allergies   Allergen Reactions    Ciprofloxacin Shortness of Breath     History reviewed. No pertinent surgical history. Social History     Occupational History    Not on file. Social History Main Topics    Smoking status: Never Smoker    Smokeless tobacco: Never Used    Alcohol use Yes    Drug use: No    Sexual activity: Yes     No family history on file. REVIEW OF SYSTEMS : 10/2/2017  ALL BELOW ARE Negative except : SEE HPI       Constitutional: Negative for fever, chills and weight loss. Neg Weigh Loss  Cardiovascular: Negative for chest pain, claudication and leg swelling.  SOB, BLAIR Gastrointestinal: Negative for  pain, N/V/D/C, Blood in stool or urine,dysuria, hematuria,        Incontinence, pelvic pain  Musculoskeletal: see HPI. Neurological: Negative for dizziness and weakness. Negative for headaches,Visual Changes, Confusion, Seizures,   Psychiatric/Behavioral: Negative for depression, memory loss and substance abuse. Extremities:  Negative for  hair changes, rash or skin lesion changes. Hematologic: Negative for Bleeding problems, bruising, pallor or swollen lymph nodes. Peripheral Vascular: No calf pain, vascular vein tenderness to calf pain              No calf throbbing, posterior knee throbbing pain    DIAGNOSTIC IMAGING     No notes on file     MRI Results (most recent):    Results from Hospital Encounter encounter on 09/08/17   MRI KNEE LT WO CONT   Narrative MRI left knee. TECHNIQUE: MRI of the knee was obtained utilizing sagittal T2 fat-saturated and  proton density fat-saturated, coronal fast spin-echo proton density and fast  spin-echo T2 fat-saturated and axial fast spin-echo T2 fat-saturated sequences  without the administration of IV contrast.    HISTORY: Pain. COMPARISON: None. FINDINGS:     Soft tissues: No significant joint effusion. Osseous/Cartilage Structures: Cartilage in all 3 compartments is preserved. Menisci: Medial meniscus is intact. Lateral meniscus is intact. Ligaments: ACL, PCL, MCL and LCL complex are intact. Muscles/Tendons: Mild distal quadriceps tendinosis. Patellar tendon is intact. Impression IMPRESSION:    Menisci and ligaments are intact. There may be mild distal quadriceps tendinosis. Thank you for this referral.      Written by Nadeem Hernandez, as dictated by Layla Frye MD. Dr. LITA, Layla Frye MD, confirm that all documentation is accurate.

## 2017-10-02 NOTE — PATIENT INSTRUCTIONS
Please follow up in 3 weeks. You are advised to contact us if your condition worsens. Posterior Tibial Tendinitis: Exercises  Your Care Instructions  Here are some examples of typical rehabilitation exercises for your condition. Start each exercise slowly. Ease off the exercise if you start to have pain. Your doctor or physical therapist will tell you when you can start these exercises and which ones will work best for you. How to do the exercises  Calf wall stretch (back knee straight)    1. Stand facing a wall with your hands on the wall at about eye level. Put your affected leg about a step behind your other leg. 2. Keeping your back leg straight and your back heel on the floor, bend your front knee and gently bring your hip and chest toward the wall until you feel a stretch in the calf of your back leg. 3. Hold the stretch for at least 15 to 30 seconds. 4. Repeat 2 to 4 times. Calf wall stretch (knees bent)    1. Stand facing a wall with your hands on the wall at about eye level. Put your affected leg about a step behind your other leg. 2. Keeping both heels on the floor, bend both knees. Then gently bring your hip and chest toward the wall until you feel a stretch in the calf of your back leg. 3. Hold the stretch for at least 15 to 30 seconds. 4. Repeat 2 to 4 times. Hamstring wall stretch    1. Lie on your back in a doorway, with your good leg through the open door. 2. Slide your affected leg up the wall to straighten your knee. You should feel a gentle stretch down the back of your leg. ¨ Do not arch your back. ¨ Do not bend either knee. ¨ Keep one heel touching the floor and the other heel touching the wall. Do not point your toes. 3. Hold the stretch for at least 1 minute to begin. Then over time, try to lengthen the time you hold the stretch to as long as 6 minutes. 4. Repeat 2 to 4 times.   If you do not have a place to do this exercise in a doorway, there is another way to do it:  1. Lie on your back, and bend the knee of your affected leg. 2. Loop a towel under the ball and toes of that foot, and hold the ends of the towel in your hands. 3. Straighten your knee, and slowly pull back on the towel. You should feel a gentle stretch down the back of your leg. 4. Hold the stretch for 15 to 30 seconds. Or even better, hold the stretch for 1 minute if you can. 5. Repeat 2 to 4 times. Shin muscle stretch    1. Sit in a chair, with both feet flat on the floor. 2. Bend your affected leg behind you so that the top of your foot near your toes is flat on the floor and your toes are pointed away from your body. If you need to, you can hold on to the sides of the chair for support. 3. Hold the stretch for at least 15 to 30 seconds. You should feel a stretch in the front (shin) of your lower leg. 4. Repeat 2 to 4 times. Follow-up care is a key part of your treatment and safety. Be sure to make and go to all appointments, and call your doctor if you are having problems. It's also a good idea to know your test results and keep a list of the medicines you take. Where can you learn more? Go to http://fabian-zeny.info/. Enter X292 in the search box to learn more about \"Posterior Tibial Tendinitis: Exercises. \"  Current as of: March 21, 2017  Content Version: 11.3  © 0435-6836 [employer]. Care instructions adapted under license by [employer] (which disclaims liability or warranty for this information). If you have questions about a medical condition or this instruction, always ask your healthcare professional. [place] [address] any warranty or liability for your use of this information.

## 2017-10-02 NOTE — PROGRESS NOTES
PT DAILY TREATMENT NOTE     Patient Name: Luisito Baxter  Date:10/2/2017  : 1995  [x]  Patient  Verified  Payor: Sukhdev Avelar / Plan: Indra Clayton / Product Type: HMO /    In time:12:04  Out time:12:40  Total Treatment Time (min): 36  Visit #: 1 of 8    Treatment Area: Left knee pain [M25.562]    SUBJECTIVE  Pain Level (0-10 scale): 3/10  Any medication changes, allergies to medications, adverse drug reactions, diagnosis change, or new procedure performed?: [x] No    [] Yes (see summary sheet for update)  Subjective functional status/changes:   [] No changes reported  The patient states that he has pain with squatting and stairs. OBJECTIVE    Modality rationale:  to improve the patients ability to    Min Type Additional Details    [] Estim:  []Unatt       []IFC  []Premod                        []Other:  []w/ice   []w/heat  Position:  Location:    [] Estim: []Att    []TENS instruct  []NMES                    []Other:  []w/US   []w/ice   []w/heat  Position:  Location:    []  Traction: [] Cervical       []Lumbar                       [] Prone          []Supine                       []Intermittent   []Continuous Lbs:  [] before manual  [] after manual    []  Ultrasound: []Continuous   [] Pulsed                           []1MHz   []3MHz W/cm2:  Location:    []  Iontophoresis with dexamethasone         Location: [] Take home patch   [] In clinic    []  Ice     []  heat  []  Ice massage  []  Laser   []  Anodyne Position:  Location:    []  Laser with stim  []  Other:  Position:  Location:    []  Vasopneumatic Device Pressure:       [] lo [] med [] hi   Temperature: [] lo [] med [] hi   [] Skin assessment post-treatment:  []intact []redness- no adverse reaction    []redness  adverse reaction:     26 min []Eval                  []Re-Eval       10 min Therapeutic Exercise:  [x] See flow sheet :   Rationale: increase ROM and increase strength to improve the patients ability to improve ADL ease. With   [] TE   [] TA   [] neuro   [] other: Patient Education: [x] Review HEP    [] Progressed/Changed HEP based on:   [] positioning   [] body mechanics   [] transfers   [] heat/ice application    [] other:      Other Objective/Functional Measures:   See IE    Pain Level (0-10 scale) post treatment: 3/10    ASSESSMENT/Changes in Function: See POC    Patient will continue to benefit from skilled PT services to modify and progress therapeutic interventions, address functional mobility deficits, address ROM deficits, address strength deficits, analyze and address soft tissue restrictions, analyze and cue movement patterns, analyze and modify body mechanics/ergonomics, assess and modify postural abnormalities and instruct in home and community integration to attain remaining goals. []  See Plan of Care  []  See progress note/recertification  []  See Discharge Summary         Progress towards goals / Updated goals:  Short Term Goals: To be accomplished in 2 weeks:                         1. The patient will be independent and compliant with HEP to maximize therapeutic benefit. 2. The patient will improve quad strength to 5/5 MMT to maximize stability during ADLs. Long Term Goals: To be accomplished in 4 weeks:                         1. The patient will improve FOTO score to 60 to maximize quality of life. 2. The patient will display 8\" step up without evidence of instability or compensation for community negotiation. 3. The patient will display effective squat form with no greater than 1/10 pain in order to improve work efficiency. 4. The patient will improve hip ABD/ER hip strength to 4+/5 MMT to improve stability of knee during squatting to reduce knee pain.     PLAN  []  Upgrade activities as tolerated     [x]  Continue plan of care  []  Update interventions per flow sheet       []  Discharge due to:_  []  Other:_ Yolanda Martinez, PT 10/2/2017  1:11 PM    Future Appointments  Date Time Provider June Ken   10/4/2017 10:00 AM Willis Duverney, PTA [facility]   10/11/2017 11:00 AM Ewa Hawk PTA [facility]   10/13/2017 10:30 AM Willis Duverney, PTA [facility]   10/18/2017 11:00 AM Willis Duverney, PTA [facility]   10/20/2017 11:00 AM Ewa Hawk PTA [facility]   10/23/2017 8:00 AM MD Yousuf Oro [facility] [address]   10/24/2017 8:30 AM MD Yousuf Hurd [facility] [address]   10/25/2017 9:00 AM Yolanda Martinez, PT [facility]   10/27/2017 11:00 AM Yolanda Martinez, PT [facility]

## 2017-10-02 NOTE — LETTER
NOTIFICATION RETURN TO WORK / SCHOOL 
 
10/2/2017 9:10 AM 
 
Ms. Mary Briscoe 
[address] [facility] [address] [place] [address] To Whom It May Concern: 
 
Pascale Ball is currently under the care of [facility] Jayson Navarro. Ms. Chiquita Palacios is under my care for left knee instability and left ankle pain associated with the knee instability. Please excuse Ms. Briscoe from lifting and unloading equipment and produce due to her condition until further notice to prevent worsening of the condition and proper healing and  strengthening. If there are questions or concerns please have the patient contact our office.  
 
 
 
Sincerely, 
 
 
Deo Rojas MD

## 2017-10-02 NOTE — MR AVS SNAPSHOT
Visit Information Date & Time Provider Department Dept. Phone Encounter #  
 10/2/2017  8:20 AM Berniece Party, [address] [facility] [phone] [id number] Upcoming Health Maintenance Date Due  
 HPV AGE 9Y-34Y (1 of 3 - Female 3 Dose Series) 9/5/2007 INFLUENZA AGE 9 TO ADULT 8/1/2017 DTaP/Tdap/Td series (1 - Tdap) 9/5/2017 PAP AKA CERVICAL CYTOLOGY 9/5/2017 Allergies as of 10/2/2017  Review Complete On: 10/2/2017 By: Aminta Alba MD  
  
 Severity Noted Reaction Type Reactions Ciprofloxacin  02/17/2015    Shortness of Breath Current Immunizations  Never Reviewed No immunizations on file. Not reviewed this visit You Were Diagnosed With   
  
 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  
 127/68 83 97.1 °F (36.2 °C) 5' 7\" (1.702 m) 209 lb (94.8 kg) 32.73 kg/m2 OB Status Smoking Status IUD Never Smoker BMI and BSA Data Body Mass Index Body Surface Area 32.73 kg/m 2 2.12 m 2 Preferred Pharmacy Pharmacy Name Phone [facility] [address] [place] [address] [phone] Your Updated Medication List  
  
   
This list is accurate as of: 10/2/17  9:15 AM.  Always use your most recent med list.  
  
  
  
  
 ibuprofen 800 mg tablet Commonly known as:  MOTRIN Take 1 Tab by mouth every eight (8) hours as needed for Pain.  
  
 levonorgestrel 20 mcg/24 hr (5 years) IUD Commonly known as:  MIRENA  
1 Each by IntraUTERine route once. We Performed the Following REFERRAL TO ORTHOPEDICS [LXV599 Custom] REFERRAL TO PHYSICAL THERAPY [GIU28 Custom] Comments: Evaluation and treatment of Patellar Femoral instability and Quadricept tendonopathy. Two to three times a week for four weeks. Referral Information Referral ID Referred By Referred To  
  
 [id number] SEYMOUR LYON [address]   
   [address] [facility] [address] [place], [address] [place] Phone: [phone] Fax: [phone] Visits Status Start Date End Date 1 New Request 10/2/17 10/2/18 If your referral has a status of pending review or denied, additional information will be sent to support the outcome of this decision. Referral ID Referred By Referred To  
 [id number] SONALI [address] [facility] [address] [place], [address] Phone: [phone] Fax: [phone] Visits Status Start Date End Date 1 New Request 10/2/17 10/2/18 If your referral has a status of pending review or denied, additional information will be sent to support the outcome of this decision. Patient Instructions Please follow up in 3 weeks. You are advised to contact us if your condition worsens. Posterior Tibial Tendinitis: Exercises Your Care Instructions Here are some examples of typical rehabilitation exercises for your condition. Start each exercise slowly. Ease off the exercise if you start to have pain. Your doctor or physical therapist will tell you when you can start these exercises and which ones will work best for you. How to do the exercises Calf wall stretch (back knee straight) 1. Stand facing a wall with your hands on the wall at about eye level. Put your affected leg about a step behind your other leg. 2. Keeping your back leg straight and your back heel on the floor, bend your front knee and gently bring your hip and chest toward the wall until you feel a stretch in the calf of your back leg. 3. Hold the stretch for at least 15 to 30 seconds. 4. Repeat 2 to 4 times. Calf wall stretch (knees bent) 1. Stand facing a wall with your hands on the wall at about eye level. Put your affected leg about a step behind your other leg. 2. Keeping both heels on the floor, bend both knees. Then gently bring your hip and chest toward the wall until you feel a stretch in the calf of your back leg. 3. Hold the stretch for at least 15 to 30 seconds. 4. Repeat 2 to 4 times. Hamstring wall stretch 1. Lie on your back in a doorway, with your good leg through the open door. 2. Slide your affected leg up the wall to straighten your knee. You should feel a gentle stretch down the back of your leg. ¨ Do not arch your back. ¨ Do not bend either knee. ¨ Keep one heel touching the floor and the other heel touching the wall. Do not point your toes. 3. Hold the stretch for at least 1 minute to begin. Then over time, try to lengthen the time you hold the stretch to as long as 6 minutes. 4. Repeat 2 to 4 times. If you do not have a place to do this exercise in a doorway, there is another way to do it: 1. Lie on your back, and bend the knee of your affected leg. 2. Loop a towel under the ball and toes of that foot, and hold the ends of the towel in your hands. 3. Straighten your knee, and slowly pull back on the towel. You should feel a gentle stretch down the back of your leg. 4. Hold the stretch for 15 to 30 seconds. Or even better, hold the stretch for 1 minute if you can. 5. Repeat 2 to 4 times. Shin muscle stretch 1. Sit in a chair, with both feet flat on the floor. 2. Bend your affected leg behind you so that the top of your foot near your toes is flat on the floor and your toes are pointed away from your body. If you need to, you can hold on to the sides of the chair for support. 3. Hold the stretch for at least 15 to 30 seconds. You should feel a stretch in the front (shin) of your lower leg. 4. Repeat 2 to 4 times. Follow-up care is a key part of your treatment and safety. Be sure to make and go to all appointments, and call your doctor if you are having problems. It's also a good idea to know your test results and keep a list of the medicines you take. Where can you learn more? Go to http://fabian-zeny.info/. Enter D051 in the search box to learn more about \"Posterior Tibial Tendinitis: Exercises. \" Current as of: March 21, 2017 Content Version: 11.3 © 9447-3346 [employer]. Care instructions adapted under license by [employer] (which disclaims liability or warranty for this information). If you have questions about a medical condition or this instruction, always ask your healthcare professional. [address] any warranty or liability for your use of this information. Introducing [facility]! Jaycee Lino introduces [employer] patient portal. Now you can access parts of your medical record, email your doctor's office, and request medication refills online. 1. In your internet browser, go to [email or web address] 2. Click on the First Time User? Click Here link in the Sign In box. You will see the New Member Sign Up page. 3. Enter your [employer] Access Code exactly as it appears below. You will not need to use this code after youve completed the sign-up process. If you do not sign up before the expiration date, you must request a new code. · [employer] Access Code: [email or web address] Expires: 10/24/2017 11:03 AM 
 
4. Enter the last four digits of your Social Security Number (xxxx) and Date of Birth (mm/dd/yyyy) as indicated and click Submit. You will be taken to the next sign-up page. 5. Create a Datadecisiont ID. This will be your [employer] login ID and cannot be changed, so think of one that is secure and easy to remember. 6. Create a [employer] password. You can change your password at any time. 7. Enter your Password Reset Question and Answer. This can be used at a later time if you forget your password. 8. Enter your e-mail address. You will receive e-mail notification when new information is available in [address] 9. Click Sign Up. You can now view and download portions of your medical record. 10. Click the Download Summary menu link to download a portable copy of your medical information. If you have questions, please visit the Frequently Asked Questions section of the PatientFocus website. Remember, PatientFocus is NOT to be used for urgent needs. For medical emergencies, dial 911. Now available from your iPhone and Android! Please provide this summary of care documentation to your next provider. Your primary care clinician is listed as Juan Carlos Townsend. If you have any questions after today's visit, please call [phone].

## 2017-10-04 ENCOUNTER — HOSPITAL ENCOUNTER (OUTPATIENT)
Dept: PHYSICAL THERAPY | Age: 22
Discharge: HOME | End: 2017-10-04
Payer: COMMERCIAL

## 2017-10-04 PROCEDURE — 97112 NEUROMUSCULAR REEDUCATION: CPT

## 2017-10-04 PROCEDURE — 97110 THERAPEUTIC EXERCISES: CPT

## 2017-10-04 NOTE — PROGRESS NOTES
PT DAILY TREATMENT NOTE     Patient Name: Theresa Sanchez  Date:10/4/2017  : 1995  [x]  Patient  Verified  Payor: Sierra Mendoza / Plan: Kia Gtz / Product Type: HMO /    In time:10:07  Out time:10:05  Total Treatment Time (min): 28  Visit #: 2 of 8    Treatment Area: Left knee pain [M25.562]    SUBJECTIVE  Pain Level (0-10 scale): 0/10  Any medication changes, allergies to medications, adverse drug reactions, diagnosis change, or new procedure performed?: [x] No    [] Yes (see summary sheet for update)  Subjective functional status/changes:   [] No changes reported  Pt denies pain but reports some soreness in (L) knee. Pt reports performing HEP. OBJECTIVE    18 min Therapeutic Exercise:  [x] See flow sheet :   Rationale: increase ROM and increase strength to improve the patients ability to tolerate ADLs     10 min Neuromuscular Re-education:  [x]  See flow sheet :KT tape for patella lift. Rationale: increase strength, improve coordination and increase proprioception  to improve the patients ability to perform functional activities. With   [] TE   [] TA   [] neuro   [] other: Patient Education: [x] Review HEP    [] Progressed/Changed HEP based on:   [] positioning   [] body mechanics   [] transfers   [] heat/ice application    [] other:      Other Objective/Functional Measures: initiated exercises as per flow sheet. Pain Level (0-10 scale) post treatment: 0/10    ASSESSMENT/Changes in Function: Pt demonstrates fair control when performing step downs but has dynamic valgus present. Patient will continue to benefit from skilled PT services to modify and progress therapeutic interventions, address functional mobility deficits, address ROM deficits, address strength deficits, analyze and address soft tissue restrictions, analyze and cue movement patterns and analyze and modify body mechanics/ergonomics to attain remaining goals.      []  See Plan of Care  []  See progress note/recertification  []  See Discharge Summary         Progress towards goals / Updated goals:  Short Term Goals: To be accomplished in 2 weeks:                         7. The patient will be independent and compliant with HEP to maximize therapeutic benefit. - Met per patient report. 10/04/17                         2. The patient will improve quad strength to 5/5 MMT to maximize stability during ADLs. Long Term Goals: To be accomplished in 4 weeks:                         1. The patient will improve FOTO score to 60 to maximize quality of life.                         2. The patient will display 8\" step up without evidence of instability or compensation for community negotiation.                         3. The patient will display effective squat form with no greater than 1/10 pain in order to improve work efficiency.                        5.  The patient will improve hip ABD/ER hip strength to 4+/5 MMT to improve stability of knee during squatting to reduce knee pain.       PLAN  []  Upgrade activities as tolerated     [x]  Continue plan of care  []  Update interventions per flow sheet       []  Discharge due to:_  []  Other:_      Krishna Gutierrez PTA 10/4/2017  10:38 AM    Future Appointments  Date Time Provider June Ken   10/11/2017 11:00 AM Mikey Bravo PTA [facility]   10/13/2017 10:30 AM Krishna Gutierrez PTA [facility]   10/18/2017 11:00 AM Krishna Gutierrez PTA [facility]   10/20/2017 11:00 AM Mikey Bravo PTA [facility]   10/23/2017 8:00 AM MD Yousuf Stubbs 69   10/24/2017 8:30 AM MD Yousuf Jain 69   10/25/2017 9:00 AM Vimal Chavez, PT [facility]   10/27/2017 11:00 AM Vimal Chavez, TORITO [facility]

## 2017-10-11 ENCOUNTER — HOSPITAL ENCOUNTER (OUTPATIENT)
Dept: PHYSICAL THERAPY | Age: 22
Discharge: HOME | End: 2017-10-11
Payer: COMMERCIAL

## 2017-10-11 PROCEDURE — 97112 NEUROMUSCULAR REEDUCATION: CPT

## 2017-10-11 PROCEDURE — 97110 THERAPEUTIC EXERCISES: CPT

## 2017-10-11 PROCEDURE — 97140 MANUAL THERAPY 1/> REGIONS: CPT

## 2017-10-11 NOTE — PROGRESS NOTES
PT DAILY TREATMENT NOTE     Patient Name: Lidia Dickerson  Date:10/11/2017  : 1995  [x]  Patient  Verified   Payor: Alejo Ayoub / Plan: Domenica Mitchell / Product Type: HMO /    In time:11:07  Out time:11:39  Total Treatment Time (min): 32  Visit #: 3 of 8    Treatment Area: Left knee pain [M25.562]    SUBJECTIVE  Pain Level (0-10 scale): 210  Any medication changes, allergies to medications, adverse drug reactions, diagnosis change, or new procedure performed?: [x] No    [] Yes (see summary sheet for update)  Subjective functional status/changes:   [] No changes reported  \"The tape helped a little. \"    OBJECTIVE    16 min Therapeutic Exercise:  [x] See flow sheet :   Rationale: increase ROM and increase strength to improve the patients ability to perform ADL's.    8 min Neuromuscular Re-education:  [x]  See flow sheet :K-Tape for patellar lift. Rationale: increase strength and increase proprioception  to improve the patients ability to perform functional activities. 8 min Manual Therapy:  STM patellar tendon, DTM VMO and distal ITB. Patellar mobs. Rationale: decrease pain, increase ROM and increase tissue extensibility to improve activity tolerance. With   [x] TE   [] TA   [] neuro   [] other: Patient Education: [x] Review HEP    [] Progressed/Changed HEP based on:   [] positioning   [] body mechanics   [] transfers   [] heat/ice application    [] other:      Other Objective/Functional Measures: Pain with eccentric step downs and mini-squats. Increased side-lying PRE's to 20 reps each. Pain Level (0-10 scale) post treatment: 3/10    ASSESSMENT/Changes in Function: Pt stated \"knee feels a little aggravated but the tape is helping\" post treatment.     Patient will continue to benefit from skilled PT services to modify and progress therapeutic interventions, address functional mobility deficits, address ROM deficits, address strength deficits and analyze and modify body mechanics/ergonomics to attain remaining goals. [x]  See Plan of Care  []  See progress note/recertification  []  See Discharge Summary         Progress towards goals / Updated goals:  Short Term Goals: To be accomplished in 2 weeks:                         9. The patient will be independent and compliant with HEP to maximize therapeutic benefit. - Met per patient report. 10/04/17                         2. The patient will improve quad strength to 5/5 MMT to maximize stability during ADLs. Long Term Goals: To be accomplished in 4 weeks:                         1. The patient will improve FOTO score to 60 to maximize quality of life.                         2. The patient will display 8\" step up without evidence of instability or compensation for community negotiation.                         3. The patient will display effective squat form with no greater than 1/10 pain in order to improve work efficiency.                        5. The patient will improve hip ABD/ER hip strength to 4+/5 MMT to improve stability of knee during squatting to reduce knee pain.     PLAN  []  Upgrade activities as tolerated     [x]  Continue plan of care  []  Update interventions per flow sheet       []  Discharge due to:_  []  Other:_      Justin Villegas PTA 10/11/2017  11:08 AM    Future Appointments  Date Time Provider June Ken   10/13/2017 10:30 [facility]   10/18/2017 11:00 AM [facility]ending[facility]   10/20/2017 11:00 AM Justin Villegas PTA [facility]   10/23/2017 8:00 AM MD Yousuf Lozada Ch[address] [address]   10/24/2017 8:30 AM MD Yousuf Obrien [address]   10/25/2017 9:00 AM [facility] Pablo[facility]   10/27/2017 11:00 AM [facility] Pablo[facility]

## 2017-10-13 ENCOUNTER — HOSPITAL ENCOUNTER (OUTPATIENT)
Dept: PHYSICAL THERAPY | Age: 22
Discharge: HOME | End: 2017-10-13
Payer: COMMERCIAL

## 2017-10-13 PROCEDURE — 97140 MANUAL THERAPY 1/> REGIONS: CPT

## 2017-10-13 PROCEDURE — 97112 NEUROMUSCULAR REEDUCATION: CPT

## 2017-10-13 PROCEDURE — 97110 THERAPEUTIC EXERCISES: CPT

## 2017-10-13 NOTE — PROGRESS NOTES
PT DAILY TREATMENT NOTE 12    Patient Name: Stephen Kim  Date:10/13/2017  : 1995  [x]  Patient  Verified  Payor: Jameson Freire / Plan: Shaun Lombardi / Product Type: HMO /    In time:10:38  Out time:11:15  Total Treatment Time (min): 37  Visit #: 4 of 8    Treatment Area: Left knee pain [M25.562]    SUBJECTIVE  Pain Level (0-10 scale): 2/10  Any medication changes, allergies to medications, adverse drug reactions, diagnosis change, or new procedure performed?: [x] No    [] Yes (see summary sheet for update)  Subjective functional status/changes:   [] No changes reported  Pt reports no new complaints of pain. Pt reports compliance with HEP. OBJECTIVE    Modality rationale: decrease inflammation and decrease pain to improve the patients ability to tolerate ADLs.     Min Type Additional Details    [] Estim:  []Unatt       []IFC  []Premod                        []Other:  []w/ice   []w/heat  Position:  Location:    [] Estim: []Att    []TENS instruct  []NMES                    []Other:  []w/US   []w/ice   []w/heat  Position:  Location:    []  Traction: [] Cervical       []Lumbar                       [] Prone          []Supine                       []Intermittent   []Continuous Lbs:  [] before manual  [] after manual    []  Ultrasound: []Continuous   [] Pulsed                           []1MHz   []3MHz W/cm2:  Location:    []  Iontophoresis with dexamethasone         Location: [] Take home patch   [] In clinic   10 [x]  Ice     []  heat  []  Ice massage  []  Laser   []  Anodyne Position:sitting  Location:(L) knee    []  Laser with stim  []  Other:  Position:  Location:    []  Vasopneumatic Device Pressure:       [] lo [] med [] hi   Temperature: [] lo [] med [] hi   [] Skin assessment post-treatment:  []intact []redness- no adverse reaction    []redness  adverse reaction:       9 min Therapeutic Exercise:  [x] See flow sheet :   Rationale: increase ROM and increase strength to improve the patients ability to tolerate ADLs. 10 min Neuromuscular Re-education:  [x]  See flow sheet :   Rationale: increase strength and improve coordination  to improve the patients ability to improve tolerance to ADL. 8 min Manual Therapy:  STM to distal quad/ITB   Rationale: decrease pain, increase ROM and increase tissue extensibility to improve tolerance to ADLs. With   [] TE   [] TA   [] neuro   [] other: Patient Education: [x] Review HEP    [] Progressed/Changed HEP based on:   [] positioning   [] body mechanics   [] transfers   [] heat/ice application    [] other:      Other Objective/Functional Measures: held KT tape (still on.)     Pain Level (0-10 scale) post treatment: 2/10    ASSESSMENT/Changes in Function: Pt continues to have pain with squats. Pt demonstrates good form with all exercises. Patient will continue to benefit from skilled PT services to modify and progress therapeutic interventions, address functional mobility deficits, address ROM deficits, address strength deficits, analyze and address soft tissue restrictions, analyze and cue movement patterns and analyze and modify body mechanics/ergonomics to attain remaining goals. []  See Plan of Care  []  See progress note/recertification  []  See Discharge Summary         Progress towards goals / Updated goals:  Short Term Goals: To be accomplished in 2 weeks:                         7. The patient will be independent and compliant with HEP to maximize therapeutic benefit. - Met per patient report. 10/04/17                         2. The patient will improve quad strength to 5/5 MMT to maximize stability during ADLs. Long Term Goals: To be accomplished in 4 weeks:                         1. The patient will improve FOTO score to 60 to maximize quality of life.                         2. The patient will display 8\" step up without evidence of instability or compensation for community negotiation.                         3.  The patient will display effective squat form with no greater than 1/10 pain in order to improve work efficiency.                        0.  The patient will improve hip ABD/ER hip strength to 4+/5 MMT to improve stability of knee during squatting to reduce knee pain.       PLAN  []  Upgrade activities as tolerated     [x]  Continue plan of care  []  Update interventions per flow sheet       []  Discharge due to:_  []  Other:_      Sonny Yung PTA 10/13/2017  11:19 AM    Future Appointments  Date Time Provider June Coheni   10/18/2017 11:00 [facility]   10/20/2017 11:00 AM Prince Aponte PTA [facility]   10/23/2017 8:00 AM MD Yousuf Martinez [address]   10/24/2017 8:30 AM MD Yousuf Cochran [address]   10/25/2017 9:00 AM Indigo Staton, PT [facility]   10/27/2017 11:00 AM Indigo Staton, TORITO [facility]

## 2017-10-18 ENCOUNTER — HOSPITAL ENCOUNTER (OUTPATIENT)
Dept: PHYSICAL THERAPY | Age: 22
Discharge: HOME | End: 2017-10-18
Payer: COMMERCIAL

## 2017-10-18 PROCEDURE — 97110 THERAPEUTIC EXERCISES: CPT

## 2017-10-18 PROCEDURE — 97112 NEUROMUSCULAR REEDUCATION: CPT

## 2017-10-18 NOTE — PROGRESS NOTES
PT DAILY TREATMENT NOTE 12    Patient Name: Rocio Bell  Date:10/18/2017  : 1995  [x]  Patient  Verified  Payor: Kumar Elizondo / Plan: Phoenix Zhang / Product Type: HMO /    In time:11:12  Out time:11:38  Total Treatment Time (min): 26  Visit #: 5 of 8    Treatment Area: Left knee pain [M25.562]    SUBJECTIVE  Pain Level (0-10 scale): 0/10  Any medication changes, allergies to medications, adverse drug reactions, diagnosis change, or new procedure performed?: [x] No    [] Yes (see summary sheet for update)  Subjective functional status/changes:   [] No changes reported  Pt denies pain. Pt reports working yesterday and having no pain. OBJECTIVE    16 min Therapeutic Exercise:  [x] See flow sheet :   Rationale: increase ROM and increase strength to improve the patients ability to tolerate ADLs. 10 min Neuromuscular Re-education:  [x]  See flow sheet :   Rationale: increase strength, improve coordination and increase proprioception  to improve the patients ability to perform functional activities. With   [] TE   [] TA   [] neuro   [] other: Patient Education: [x] Review HEP    [] Progressed/Changed HEP based on:   [] positioning   [] body mechanics   [] transfers   [] heat/ice application    [] other:      Other Objective/Functional Measures: added band walks, QP shuttle press, glute activation, alla clams. Pain Level (0-10 scale) post treatment: 0/10    ASSESSMENT/Changes in Function: Pt demonstrates no increased symptoms with updated exercises. Pt continues to demonstrate instability however. Patient will continue to benefit from skilled PT services to modify and progress therapeutic interventions, address functional mobility deficits, address ROM deficits, address strength deficits, analyze and address soft tissue restrictions, analyze and cue movement patterns and analyze and modify body mechanics/ergonomics to attain remaining goals.      []  See Plan of Care  [] See progress note/recertification  []  See Discharge Summary         Progress towards goals / Updated goals:  Progress towards goals / Updated goals:  Short Term Goals: To be accomplished in 2 weeks:                         0. The patient will be independent and compliant with HEP to maximize therapeutic benefit. - Met per patient report. 10/04/17                         2. The patient will improve quad strength to 5/5 MMT to maximize stability during ADLs. Long Term Goals: To be accomplished in 4 weeks:                         1. The patient will improve FOTO score to 60 to maximize quality of life.                         2. The patient will display 8\" step up without evidence of instability or compensation for community negotiation. -not met; Pt able to perform 6 inch step up but continues to demonstrate instability. 10/18/17                         3. The patient will display effective squat form with no greater than 1/10 pain in order to improve work efficiency.                        8.  The patient will improve hip ABD/ER hip strength to 4+/5 MMT to improve stability of knee during squatting to reduce knee pain.       PLAN  []  Upgrade activities as tolerated     [x]  Continue plan of care  []  Update interventions per flow sheet       []  Discharge due to:_  []  Other:_      Amadou Ramirez PTA 10/18/2017  11:59 AM    Future Appointments  Date Time Provider June Ken   10/20/2017 11:00 AM Ninfa Cheney PTA [facility]   10/23/2017 8:00 AM MD Maria G Bridges 75   10/24/2017 8:30 AM MD Maria G Benitez [address]   10/25/2017 9:00 AM Theodor Push, PT [facility]   10/27/2017 11:00 AM Theodor Push, PT [facility]

## 2017-10-20 ENCOUNTER — HOSPITAL ENCOUNTER (OUTPATIENT)
Dept: PHYSICAL THERAPY | Age: 22
Discharge: HOME | End: 2017-10-20
Payer: COMMERCIAL

## 2017-10-20 PROCEDURE — 97112 NEUROMUSCULAR REEDUCATION: CPT

## 2017-10-20 PROCEDURE — 97110 THERAPEUTIC EXERCISES: CPT

## 2017-10-20 NOTE — PROGRESS NOTES
PT DAILY TREATMENT NOTE     Patient Name: Candis Park  Date:10/20/2017  : 1995  [x]  Patient  Verified  Payor: Alicia Conklin / Plan: Danielle Brooks / Product Type: HMO /    In time:11:05  Out time:11:36  Total Treatment Time (min): 31  Visit #: 6 of 8    Treatment Area: Left knee pain [M25.562]    SUBJECTIVE  Pain Level (0-10 scale): 3-4/10  Any medication changes, allergies to medications, adverse drug reactions, diagnosis change, or new procedure performed?: [x] No    [] Yes (see summary sheet for update)  Subjective functional status/changes:   [] No changes reported  \"It's been better except for today, I was on my feet a lot yesterday. \"    OBJECTIVE    21 min Therapeutic Exercise:  [x] See flow sheet :   Rationale: increase ROM and increase strength to improve the patients ability to perform ADL's. 10 min Neuromuscular Re-education:  [x]  See flow sheet :   Rationale: increase strength and increase proprioception  to improve the patients ability to perform functional activities. With   [x] TE   [] TA   [] neuro   [] other: Patient Education: [x] Review HEP    [] Progressed/Changed HEP based on:   [] positioning   [] body mechanics   [] transfers   [] heat/ice application    [] other:      Other Objective/Functional Measures: MMT (L) Knee extension 4+/5 with distal quad pain. Pain Level (0-10 scale) post treatment: 3/10    ASSESSMENT/Changes in Function: FOTO improved to 72%. Patient will continue to benefit from skilled PT services to modify and progress therapeutic interventions, address functional mobility deficits, address ROM deficits, address strength deficits, analyze and cue movement patterns and analyze and modify body mechanics/ergonomics to attain remaining goals.      [x]  See Plan of Care  []  See progress note/recertification  []  See Discharge Summary         Progress towards goals / Updated goals:  Short Term Goals: To be accomplished in 2 weeks:                         9. The patient will be independent and compliant with HEP to maximize therapeutic benefit. - Met per patient report. 10/04/17                         2. The patient will improve quad strength to 5/5 MMT to maximize stability during ADLs. - MMT (L) Knee extension 4+/5 with distal quad pain. 10/20/2017  Long Term Goals: To be accomplished in 4 weeks:                         6. The patient will improve FOTO score to 60 to maximize quality of life. - Met, 72%. 10/20/2017                         2. The patient will display 8\" step up without evidence of instability or compensation for community negotiation. -not met; Pt able to perform 6 inch step up but continues to demonstrate instability. 10/18/17                          3. The patient will display effective squat form with no greater than 1/10 pain in order to improve work efficiency.                        4. The patient will improve hip ABD/ER hip strength to 4+/5 MMT to improve stability of knee during squatting to reduce knee pain.     PLAN  []  Upgrade activities as tolerated     [x]  Continue plan of care  []  Update interventions per flow sheet       []  Discharge due to:_  []  Other:_      Mat Seth PTA 10/20/2017  11:05 AM    Future Appointments  Date Time Provider June Ken   10/23/2017 8:00 AM MD Yousuf Yu 69   10/24/2017 8:30 AM MD Yousuf Goode 69   10/25/2017 9:00 AM [facility]   10/27/2017 11:00 AM [facility]

## 2017-10-23 ENCOUNTER — OFFICE VISIT (OUTPATIENT)
Dept: ORTHOPEDIC SURGERY | Age: 22
End: 2017-10-23

## 2017-10-23 VITALS
HEIGHT: 68 IN | WEIGHT: 200 LBS | TEMPERATURE: 98 F | BODY MASS INDEX: 30.31 KG/M2 | DIASTOLIC BLOOD PRESSURE: 71 MMHG | HEART RATE: 74 BPM | SYSTOLIC BLOOD PRESSURE: 123 MMHG

## 2017-10-23 DIAGNOSIS — M76.822 POSTERIOR TIBIAL TENDINITIS OF LEFT LOWER EXTREMITY: Primary | ICD-10-CM

## 2017-10-23 NOTE — PATIENT INSTRUCTIONS
Please follow up as needed. You are advised to contact us if your condition worsens. Posterior Tibial Tendinitis: Exercises  Your Care Instructions  Here are some examples of typical rehabilitation exercises for your condition. Start each exercise slowly. Ease off the exercise if you start to have pain. Your doctor or physical therapist will tell you when you can start these exercises and which ones will work best for you. How to do the exercises  Calf wall stretch (back knee straight)    1. Stand facing a wall with your hands on the wall at about eye level. Put your affected leg about a step behind your other leg. 2. Keeping your back leg straight and your back heel on the floor, bend your front knee and gently bring your hip and chest toward the wall until you feel a stretch in the calf of your back leg. 3. Hold the stretch for at least 15 to 30 seconds. 4. Repeat 2 to 4 times. Calf wall stretch (knees bent)    1. Stand facing a wall with your hands on the wall at about eye level. Put your affected leg about a step behind your other leg. 2. Keeping both heels on the floor, bend both knees. Then gently bring your hip and chest toward the wall until you feel a stretch in the calf of your back leg. 3. Hold the stretch for at least 15 to 30 seconds. 4. Repeat 2 to 4 times. Hamstring wall stretch    1. Lie on your back in a doorway, with your good leg through the open door. 2. Slide your affected leg up the wall to straighten your knee. You should feel a gentle stretch down the back of your leg. ¨ Do not arch your back. ¨ Do not bend either knee. ¨ Keep one heel touching the floor and the other heel touching the wall. Do not point your toes. 3. Hold the stretch for at least 1 minute to begin. Then over time, try to lengthen the time you hold the stretch to as long as 6 minutes. 4. Repeat 2 to 4 times.   If you do not have a place to do this exercise in a doorway, there is another way to do it:  1. Lie on your back, and bend the knee of your affected leg. 2. Loop a towel under the ball and toes of that foot, and hold the ends of the towel in your hands. 3. Straighten your knee, and slowly pull back on the towel. You should feel a gentle stretch down the back of your leg. 4. Hold the stretch for 15 to 30 seconds. Or even better, hold the stretch for 1 minute if you can. 5. Repeat 2 to 4 times. Shin muscle stretch    1. Sit in a chair, with both feet flat on the floor. 2. Bend your affected leg behind you so that the top of your foot near your toes is flat on the floor and your toes are pointed away from your body. If you need to, you can hold on to the sides of the chair for support. 3. Hold the stretch for at least 15 to 30 seconds. You should feel a stretch in the front (shin) of your lower leg. 4. Repeat 2 to 4 times. Follow-up care is a key part of your treatment and safety. Be sure to make and go to all appointments, and call your doctor if you are having problems. It's also a good idea to know your test results and keep a list of the medicines you take. Where can you learn more? Go to http://fabian-zeny.info/. Enter L970 in the search box to learn more about \"Posterior Tibial Tendinitis: Exercises. \"  Current as of: March 21, 2017  Content Version: 11.3  © 1437-3790 [employer]. Care instructions adapted under license by [employer] (which disclaims liability or warranty for this information). If you have questions about a medical condition or this instruction, always ask your healthcare professional. [place] [address] any warranty or liability for your use of this information.

## 2017-10-23 NOTE — MR AVS SNAPSHOT
Visit Information Date & Time Provider Department Dept. Phone Encounter #  
 10/23/2017  8:00 AM Marc Howard MD [facility] [phone] [id number] Your Appointments 10/24/2017  8:30 AM  
New Patient with Mike Gonzalez MD  
[address] [facility], [address] [facility] ([facility]) Appt Note: lt knee pain per [address] [facility], [address] [facility]  
[phone] [address] [facility], [address] Upcoming Health Maintenance Date Due  
 HPV AGE 9Y-34Y (1 of 3 - Female 3 Dose Series) 9/5/2007 INFLUENZA AGE 9 TO ADULT 8/1/2017 DTaP/Tdap/Td series (1 - Tdap) 9/5/2017 PAP AKA CERVICAL CYTOLOGY 9/5/2017 Allergies as of 10/23/2017  Review Complete On: 10/23/2017 By: Marc Howard MD  
  
 Severity Noted Reaction Type Reactions Latex, Natural Rubber  10/02/2017    Not Reported This Time Ciprofloxacin  02/17/2015    Shortness of Breath Current Immunizations  Never Reviewed No immunizations on file. Not reviewed this visit You Were Diagnosed With   
  
 Codes Comments Posterior tibial tendinitis of left lower extremity    -  Primary ICD-10-CM: J61.320 ICD-9-CM: 726.72 Vitals BP Pulse Temp Height(growth percentile) Weight(growth percentile) BMI  
 123/71 74 98 °F (36.7 °C) (Oral) 5' 8\" (1.727 m) 200 lb (90.7 kg) 30.41 kg/m2 OB Status Smoking Status IUD Never Smoker BMI and BSA Data Body Mass Index Body Surface Area  
 30.41 kg/m 2 2.09 m 2 Preferred Pharmacy Pharmacy Name Phone [facility] [address], [facility] [place] [address] [phone] Your Updated Medication List  
  
   
This list is accurate as of: 10/23/17  8:35 AM.  Always use your most recent med list.  
  
  
  
  
 ibuprofen 800 mg tablet Commonly known as:  MOTRIN  
 Take 1 Tab by mouth every eight (8) hours as needed for Pain.  
  
 levonorgestrel 20 mcg/24 hr (5 years) IUD Commonly known as:  MIRENA  
1 Each by IntraUTERine route once. To-Do List   
 10/25/2017 9:00 AM  
  Appointment with Mayra Welch PT at [facility]  [facility] [address] ([phone])  
  
 10/27/2017 11:00 AM  
  Appointment with Mayra Welch PT at [facility]  [facility] [address] ([phone]) Patient Instructions Please follow up as needed. You are advised to contact us if your condition worsens. Posterior Tibial Tendinitis: Exercises Your Care Instructions Here are some examples of typical rehabilitation exercises for your condition. Start each exercise slowly. Ease off the exercise if you start to have pain. Your doctor or physical therapist will tell you when you can start these exercises and which ones will work best for you. How to do the exercises Calf wall stretch (back knee straight) 1. Stand facing a wall with your hands on the wall at about eye level. Put your affected leg about a step behind your other leg. 2. Keeping your back leg straight and your back heel on the floor, bend your front knee and gently bring your hip and chest toward the wall until you feel a stretch in the calf of your back leg. 3. Hold the stretch for at least 15 to 30 seconds. 4. Repeat 2 to 4 times. Calf wall stretch (knees bent) 1. Stand facing a wall with your hands on the wall at about eye level. Put your affected leg about a step behind your other leg. 2. Keeping both heels on the floor, bend both knees. Then gently bring your hip and chest toward the wall until you feel a stretch in the calf of your back leg. 3. Hold the stretch for at least 15 to 30 seconds. 4. Repeat 2 to 4 times. Hamstring wall stretch 1. Lie on your back in a doorway, with your good leg through the open door. 2. Slide your affected leg up the wall to straighten your knee.  You should feel a gentle stretch down the back of your leg. ¨ Do not arch your back. ¨ Do not bend either knee. ¨ Keep one heel touching the floor and the other heel touching the wall. Do not point your toes. 3. Hold the stretch for at least 1 minute to begin. Then over time, try to lengthen the time you hold the stretch to as long as 6 minutes. 4. Repeat 2 to 4 times. If you do not have a place to do this exercise in a doorway, there is another way to do it: 1. Lie on your back, and bend the knee of your affected leg. 2. Loop a towel under the ball and toes of that foot, and hold the ends of the towel in your hands. 3. Straighten your knee, and slowly pull back on the towel. You should feel a gentle stretch down the back of your leg. 4. Hold the stretch for 15 to 30 seconds. Or even better, hold the stretch for 1 minute if you can. 5. Repeat 2 to 4 times. Shin muscle stretch 1. Sit in a chair, with both feet flat on the floor. 2. Bend your affected leg behind you so that the top of your foot near your toes is flat on the floor and your toes are pointed away from your body. If you need to, you can hold on to the sides of the chair for support. 3. Hold the stretch for at least 15 to 30 seconds. You should feel a stretch in the front (shin) of your lower leg. 4. Repeat 2 to 4 times. Follow-up care is a key part of your treatment and safety. Be sure to make and go to all appointments, and call your doctor if you are having problems. It's also a good idea to know your test results and keep a list of the medicines you take. Where can you learn more? Go to http://fabian-zeny.info/. Enter G675 in the search box to learn more about \"Posterior Tibial Tendinitis: Exercises. \" Current as of: March 21, 2017 Content Version: 11.3 © 4592-6469 [employer].  Care instructions adapted under license by [employer] (which disclaims liability or warranty for this information). If you have questions about a medical condition or this instruction, always ask your healthcare professional. [address] any warranty or liability for your use of this information. Introducing [facility]! [facility] introduces RoomClip patient portal. Now you can access parts of your medical record, email your doctor's office, and request medication refills online. 1. In your internet browser, go to [email or web address] 2. Click on the First Time User? Click Here link in the Sign In box. You will see the New Member Sign Up page. 3. Enter your RoomClip Access Code exactly as it appears below. You will not need to use this code after youve completed the sign-up process. If you do not sign up before the expiration date, you must request a new code. · RoomClip Access Code: [email or web address] Expires: 10/24/2017 11:03 AM 
 
4. Enter the last four digits of your Social Security Number (xxxx) and Date of Birth (mm/dd/yyyy) as indicated and click Submit. You will be taken to the next sign-up page. 5. Create a RoomClip ID. This will be your RoomClip login ID and cannot be changed, so think of one that is secure and easy to remember. 6. Create a RoomClip password. You can change your password at any time. 7. Enter your Password Reset Question and Answer. This can be used at a later time if you forget your password. 8. Enter your e-mail address. You will receive e-mail notification when new information is available in [address] 9. Click Sign Up. You can now view and download portions of your medical record. 10. Click the Download Summary menu link to download a portable copy of your medical information. If you have questions, please visit the Frequently Asked Questions section of the RoomClip website. Remember, RoomClip is NOT to be used for urgent needs. For medical emergencies, dial 911. Now available from your iPhone and Android! Please provide this summary of care documentation to your next provider. Your primary care clinician is listed as Marah Santillan. If you have any questions after today's visit, please call [phone].

## 2017-10-23 NOTE — PROGRESS NOTES
AMBULATORY PROGRESS NOTE      Patient: Rossy Zavala             MRN: [id number]     SSN: [id number] Body mass index is 30.41 kg/(m^2). YOB: 1995     AGE: 25 y.o. EX: female    PCP: Noemi Jordan MD    IMPRESSION/DIAGNOSIS AND TREATMENT PLAN     DIAGNOSES  1. Posterior tibial tendinitis of left lower extremity        No orders of the defined types were placed in this encounter. Rossy Zavala understands her diagnoses and the proposed plan. Plan:    1) Follow up with Dr. Nirali Abdullahi for her LEFT KNEE  2) Continue activity modification as directed. RTO - PRN with me for her left foot tib pos tendonitis     - Follow up with Dr. Nirali Abdullahi. HPI AND EXAMINATION     Mary Briscoe IS A 25 y.o. female who presents to my outpatient office for follow up of an injury to the left knee. At last visit, I provided a referral for physical therapy, a referral for Dr. Nirali Abdullahi, and recommended to continue the HEP as directed. The patient presents to the office today rating her pain at 2/10. Ms. Umu Desir reports some soreness along her left ankle, but she denies any pain while going up and down steps. The patient will follow up with Dr. Nirali Abdullahi for her left knee. She reports the physical therapy has helped since she started. Ms. Umu Desir states she has been doing better since her last visit. Appearance: Alert, well appearing and pleasant patient who is in no distress, oriented to person, place/time, and who follows commands. This patient is accompanied in the office by her  self. Psychiatric: Affect and mood are appropriate.    Cardiovascular/Peripheral Vascular: Normal Pulses to each hand and foot      Knees:  LEFT                         Gait: slow                          Cutaneous: Skin intact, no abrasions, blisters, wounds, erythema                        Effusion: Is not present                        Crepitus:  no PF joint crepitus                        Tenderness: Mild joint tenderness to Medial joint line/MCL region, Trina - today. Mild tenderness to the Quadricept tendon                                                                              Mild tenderness over the PF ligament                         Alignment of Knee: no neutral when standing                        ROM: full range of motion                        Fullness or swelling: None to popliteal fossa region                        Stability: No instability to anterior, posterior, varus, valgus stress testing                        Trust: No varus trust with gait                        Contractures: No Achilles or Gastrocnemius Contractures.                         Calf tenderness: Absent for calf or gastrocnemius muscle regions                                                                Soft, supple, non tender, non taut lower extremity compartments  Extremities:   No embolic phenomena to the toes                           No significant edema to the foot and or toes.                         NECWL is not present to distal 1/3 tib/fib or ankle regions.                         Lower extremities are warm and appear well perfused                          DVT: No evidence of DVT seen on examination at this time                          No calf swelling, no tenderness to calf muscles  Lymphatic:  No Evidence of Lymphedema  Vascular: Medial Border of Tibia Region: Edema is not present                   Pulses: Dorsalis Pedis &  Posterior Tibial Pulses : Palpable yes                   Varicosities Lower Limbs : None noted .   Neuro: Negative bilateral Straight leg raise (seated position)               See Musculoskeletal section for pertinent individual extremity examination               No abnormal hand/wrist, foot/ankle, or facial/neck tremors.               Extensor mechanism is intact     ANKLE/FOOT left      Tenderness: No tenderness to palpation  Cutaneous: Mild to medial aspect of ankle  Joint Motion: WNL  Joint / Tendon Stability: No Ankle or Subtalar instability or joint laxity.                                            No peroneal sublux ability or dislocation  Alignment:  Normal Foot Alignment and  Flexible  Neuro Motor/Sensory: NL/NL  Vascular: NL foot/ankle pulses  Lymphatics: No extremity lymphedema, No calf swelling, no tenderness to calf muscles. CHART REVIEW     History reviewed. No pertinent past medical history. Current Outpatient Prescriptions   Medication Sig    levonorgestrel (MIRENA) 20 mcg/24 hr (5 years) IUD 1 Each by IntraUTERine route once.  ibuprofen (MOTRIN) 800 mg tablet Take 1 Tab by mouth every eight (8) hours as needed for Pain. No current facility-administered medications for this visit. Allergies   Allergen Reactions    Latex, Natural Rubber Not Reported This Time    Ciprofloxacin Shortness of Breath     History reviewed. No pertinent surgical history. Social History     Occupational History    Not on file. Social History Main Topics    Smoking status: Never Smoker    Smokeless tobacco: Never Used    Alcohol use Yes    Drug use: No    Sexual activity: Yes     History reviewed. No pertinent family history. REVIEW OF SYSTEMS : 10/23/2017  ALL BELOW ARE Negative except : SEE HPI       Constitutional: Negative for fever, chills and weight loss. Neg Weigh Loss  Cardiovascular: Negative for chest pain, claudication and leg swelling. SOB, BLAIR   Gastrointestinal: Negative for  pain, N/V/D/C, Blood in stool or urine,dysuria, hematuria,        Incontinence, pelvic pain  Musculoskeletal: see HPI. Neurological: Negative for dizziness and weakness. Negative for headaches,Visual Changes, Confusion, Seizures,   Psychiatric/Behavioral: Negative for depression, memory loss and substance abuse. Extremities:  Negative for  hair changes, rash or skin lesion changes.   Hematologic: Negative for Bleeding problems, bruising, pallor or swollen lymph nodes. Peripheral Vascular: No calf pain, vascular vein tenderness to calf pain              No calf throbbing, posterior knee throbbing pain    DIAGNOSTIC IMAGING     No notes on file    Written by Devi Crystal, as dictated by Tessa Garcia MD. I, , Tessa Garcia MD, confirm that all documentation is accurate.

## 2017-10-24 ENCOUNTER — OFFICE VISIT (OUTPATIENT)
Dept: ORTHOPEDIC SURGERY | Age: 22
End: 2017-10-24

## 2017-10-24 VITALS
DIASTOLIC BLOOD PRESSURE: 80 MMHG | HEIGHT: 68 IN | SYSTOLIC BLOOD PRESSURE: 141 MMHG | HEART RATE: 72 BPM | BODY MASS INDEX: 31.34 KG/M2 | TEMPERATURE: 97 F | WEIGHT: 206.8 LBS | RESPIRATION RATE: 18 BRPM | OXYGEN SATURATION: 97 %

## 2017-10-24 DIAGNOSIS — M22.2X2 PATELLOFEMORAL SYNDROME, LEFT: Primary | ICD-10-CM

## 2017-10-24 RX ORDER — ETODOLAC 400 MG/1
400 TABLET, FILM COATED ORAL 2 TIMES DAILY WITH MEALS
Qty: 60 TAB | Refills: 0 | Status: SHIPPED | OUTPATIENT
Start: 2017-10-24 | End: 2017-10-27 | Stop reason: SINTOL

## 2017-10-24 NOTE — PATIENT INSTRUCTIONS
Patellofemoral Pain Syndrome (Runner's Knee): Exercises  Your Care Instructions  Here are some examples of typical rehabilitation exercises for your condition. Start each exercise slowly. Ease off the exercise if you start to have pain. Your doctor or physical therapist will tell you when you can start these exercises and which ones will work best for you. How to do the exercises  Calf wall stretch    1. Stand facing a wall with your hands on the wall at about eye level. Put your affected leg about a step behind your other leg. 2. Keeping your back leg straight and your back heel on the floor, bend your front knee and gently bring your hip and chest toward the wall until you feel a stretch in the calf of your back leg. 3. Hold the stretch for at least 15 to 30 seconds. 4. Repeat 2 to 4 times. 5. Repeat steps 1 through 4, but this time keep your back knee bent. Quadriceps stretch    1. If you are not steady on your feet, hold on to a chair, counter, or wall. 2. Bend your affected leg, and reach behind you to grab the front of your foot or ankle with the hand on the same side. For example, if you are stretching your right leg, use your right hand. 3. Keeping your knees next to each other, pull your foot toward your buttock until you feel a gentle stretch across the front of your hip and down the front of your thigh. Your knee should be pointed directly to the ground, and not out to the side. 4. Hold the stretch for at least 15 to 30 seconds. 5. Repeat 2 to 4 times. Hamstring wall stretch    1. Lie on your back in a doorway, with your good leg through the open door. 2. Slide your affected leg up the wall to straighten your knee. You should feel a gentle stretch down the back of your leg. ¨ Do not arch your back. ¨ Do not bend either knee. ¨ Keep one heel touching the floor and the other heel touching the wall. Do not point your toes. 3. Hold the stretch for at least 1 minute.  Then over time, try to lengthen the time you hold the stretch to as long as 6 minutes. 4. Repeat 2 to 4 times. If you do not have a place to do this exercise in a doorway, there is another way to do it:  1. Lie on your back, and bend your affected leg. 2. Loop a towel under the ball and toes of that foot, and hold the ends of the towel in your hands. 3. Straighten your knee, and slowly pull back on the towel. You should feel a gentle stretch down the back of your leg. 4. Hold the stretch for at least 15 to 30 seconds. Or even better, hold the stretch for 1 minute if you can. 5. Repeat 2 to 4 times. Quad sets    1. Sit with your affected leg straight and supported on the floor or a firm bed. Place a small, rolled-up towel under your affected knee. Your other leg should be bent, with that foot flat on the floor. 2. Tighten the thigh muscles of your affected leg by pressing the back of your knee down into the towel. 3. Hold for about 6 seconds, then rest for up to 10 seconds. 4. Repeat 8 to 12 times. Straight-leg raises to the front    1. Lie on your back with your good knee bent so that your foot rests flat on the floor. Your affected leg should be straight. Make sure that your low back has a normal curve. You should be able to slip your hand in between the floor and the small of your back, with your palm touching the floor and your back touching the back of your hand. 2. Tighten the thigh muscles in your affected leg by pressing the back of your knee flat down to the floor. Hold your knee straight. 3. Keeping the thigh muscles tight and your leg straight, lift your affected leg up so that your heel is about 12 inches off the floor. 4. Hold for about 6 seconds, then lower your leg slowly. Rest for up to 10 seconds between repetitions. 5. Repeat 8 to 12 times. Straight-leg raises to the back    1. Lie on your stomach, and lift your leg straight up behind you (toward the ceiling).   2. Lift your toes about 6 inches off the floor, hold for about 6 seconds, then lower slowly. 3. Do 8 to 12 repetitions. Wall slide with ball squeeze    1. Stand with your back against a wall and with your feet about shoulder-width apart. Your feet should be about 12 inches away from the wall. 2. Put a ball about the size of a soccer ball between your knees. Then slowly slide down the wall until your knees are bent about 20 to 30 degrees. 3. Tighten your thigh muscles by squeezing the ball between your knees. Hold that position for about 10 seconds, then stop squeezing. Rest for up to 10 seconds between repetitions. 4. Repeat 8 to 12 times. Follow-up care is a key part of your treatment and safety. Be sure to make and go to all appointments, and call your doctor if you are having problems. It's also a good idea to know your test results and keep a list of the medicines you take. Where can you learn more? Go to http://fabian-zeny.info/. Enter A404 in the search box to learn more about \"Patellofemoral Pain Syndrome (Runner's Knee): Exercises. \"  Current as of: March 21, 2017  Content Version: 11.3  © 7676-8608 [employer]. Care instructions adapted under license by [employer] (which disclaims liability or warranty for this information). If you have questions about a medical condition or this instruction, always ask your healthcare professional. [place] [address] any warranty or liability for your use of this information.

## 2017-10-24 NOTE — PROGRESS NOTES
Brendan Gore  1995   Chief Complaint   Patient presents with    Knee Pain     Left        HISTORY OF PRESENT ILLNESS  Brendan Gore is a 25 y.o. female who presents today for evaluation of left knee pain. Patient referred by Dr. Chanell Mendes for further evaluation. she rates her pain 3/10 today. Patient recalls she fell in July and has had continued pain since. Initially the pain was sharp but now the pain is dull and achy now. She is currently in PT. Symptoms are worse with prolonged walking, squatting, bending and is better with  Rest. Associated symptoms include none. Since problem started, it: has slightly improved. Pain does not wake patient up at night. Has taken no medication for the problem. Has tried following treatments: Injections:NO; Brace:NO; Therapy:NO; Cane/Crutch:NO       Allergies   Allergen Reactions    Latex, Natural Rubber Not Reported This Time    Ciprofloxacin Shortness of Breath        History reviewed. No pertinent past medical history. Social History     Social History    Marital status: SINGLE     Spouse name: N/A    Number of children: N/A    Years of education: N/A     Occupational History    Not on file. Social History Main Topics    Smoking status: Never Smoker    Smokeless tobacco: Never Used    Alcohol use Yes    Drug use: No    Sexual activity: Yes     Other Topics Concern    Not on file     Social History Narrative      History reviewed. No pertinent surgical history. History reviewed. No pertinent family history. Current Outpatient Prescriptions   Medication Sig    etodolac (LODINE) 400 mg tablet Take 400 mg by mouth two (2) times daily (with meals).  levonorgestrel (MIRENA) 20 mcg/24 hr (5 years) IUD 1 Each by IntraUTERine route once.  ibuprofen (MOTRIN) 800 mg tablet Take 1 Tab by mouth every eight (8) hours as needed for Pain. No current facility-administered medications for this visit.         REVIEW OF SYSTEM   Patient denies: Weight loss, Fever/Chills, HA, Visual changes, Fatigue, Chest pain, SOB, Abdominal pain, N/V/D/C, Blood in stool or urine, Edema. Pertinent positive as above in HPI. All others were negative    PHYSICAL EXAM:   Visit Vitals    /80    Pulse 72    Temp 97 °F (36.1 °C) (Oral)    Resp 18    Ht 5' 8\" (1.727 m)    Wt 206 lb 12.8 oz (93.8 kg)    SpO2 97%    BMI 31.44 kg/m2     The patient is a well-developed, well-nourished female   in no acute distress. The patient is alert and oriented times three. The patient is alert and oriented times three. Mood and affect are normal.  LYMPHATIC: lymph nodes are not enlarged and are within normal limits  SKIN: normal in color and non tender to palpation. There are no bruises or abrasions noted. NEUROLOGICAL: Motor sensory exam is within normal limits. Reflexes are equal bilaterally. There is normal sensation to pinprick and light touch  MUSCULOSKELETAL:  Examination Left knee   Skin Intact   Range of motion 0-130   Effusion -   Medial joint line tenderness -   Lateral joint line tenderness -   Tenderness Pes Bursa -   Tenderness insertion MCL -   Tenderness insertion LCL -   Trinas -   Patella crepitus -   Patella grind -   Lachman -   Pivot shift -   Anterior drawer -   Posterior drawer -   Varus stress -   Valgus stress -   Neurovascular Intact   Calf Swelling and Tenderness to Palpation -   Cristiane's Test -   Hamstring Cord Tightness +       IMAGING:   MRI of the left knee dated 9/8/17 was reviewed and read:   IMPRESSION   Menisci and ligaments are intact. There may be mild distal quadriceps tendinosis. IMPRESSION:      ICD-10-CM ICD-9-CM    1. Patellofemoral syndrome, left M22.2X2 719.46 etodolac (LODINE) 400 mg tablet      REFERRAL TO PHYSICAL THERAPY        PLAN:  1. Patient experiencing left knee pain. Based on the MRI, discussed with her that this should get better with an antiinflammatory and therapy. Risk factors include: n/a  2.  No cortisone injection indicated today   3. Yes Physical Therapy indicated today: Focus on hamstring stretches  4. No diagnostic test indicated today  5. No durable medical equipment indicated today  6. No referral indicated today   7. Yes medications indicated today: LODINE 400 BID  8. No Narcotic indicated today      RTC 4 weeks  Office note will be sent to the referring provider. Follow-up Disposition: Not on File    Scribed by Katt Montgomery[address]) as dictated by Shayy Palencia MD    I, Dr. Shayy Palencia, confirm that all documentation is accurate.     Shayy Palencia M.D.   [facility]

## 2017-10-25 ENCOUNTER — HOSPITAL ENCOUNTER (OUTPATIENT)
Dept: PHYSICAL THERAPY | Age: 22
End: 2017-10-25
Payer: COMMERCIAL

## 2017-10-27 ENCOUNTER — HOSPITAL ENCOUNTER (OUTPATIENT)
Dept: PHYSICAL THERAPY | Age: 22
Discharge: HOME | End: 2017-10-27
Payer: COMMERCIAL

## 2017-10-27 ENCOUNTER — TELEPHONE (OUTPATIENT)
Dept: ORTHOPEDIC SURGERY | Age: 22
End: 2017-10-27

## 2017-10-27 PROCEDURE — 97110 THERAPEUTIC EXERCISES: CPT

## 2017-10-27 PROCEDURE — 97112 NEUROMUSCULAR REEDUCATION: CPT

## 2017-10-27 RX ORDER — MELOXICAM 15 MG/1
15 TABLET ORAL
Qty: 30 TAB | Refills: 0 | Status: SHIPPED | OUTPATIENT
Start: 2017-10-27 | End: 2017-11-29

## 2017-10-27 NOTE — PROGRESS NOTES
PT DAILY TREATMENT NOTE     Patient Name: Sherryle Marsh  Date:10/27/2017  : 1995  [x]  Patient  Verified  Payor: Darryle Blander / Plan: Apoorva Malone / Product Type: HMO /    In time:11:03  Out time:11:41  Total Treatment Time (min): 38  Visit #: 7 of 8    Treatment Area: Left knee pain [M25.562]    SUBJECTIVE  Pain Level (0-10 scale): 0/10  Any medication changes, allergies to medications, adverse drug reactions, diagnosis change, or new procedure performed?: [x] No    [] Yes (see summary sheet for update)  Subjective functional status/changes:   [] No changes reported  The patient states that her knee is definitely feeling better and reports she was able to run for the first time in a while without pain. She does state that she does have discomfort following her 12-13 hour days through her knee. OBJECTIVE  28 min Therapeutic Exercise:  [x] See flow sheet :   Rationale: increase ROM and increase strength to improve the patients ability to improve ADL ease. 10 min Neuromuscular Re-education:  [x]  See flow sheet :   Rationale: increase ROM and increase strength  to improve the patients ability to improve ADL ease. With   [] TE   [] TA   [] neuro   [] other: Patient Education: [x] Review HEP    [] Progressed/Changed HEP based on:   [] positioning   [] body mechanics   [] transfers   [] heat/ice application    [] other:      Other Objective/Functional Measures:   Hip ABD: 4/5 MMT B    Pt displays fair squat form    Pain Level (0-10 scale) post treatment: 2/10    ASSESSMENT/Changes in Function: The patient has progressed fairly well with regards to strength and squat form. She did indicate she returned to running somewhat since last treatment with fairly good tolerance. The patient will continue to benefit from 2 additional weeks of PT in order to progress to HEP at that time.     Patient will continue to benefit from skilled PT services to modify and progress therapeutic interventions, address functional mobility deficits, address ROM deficits, address strength deficits, analyze and address soft tissue restrictions, analyze and cue movement patterns, analyze and modify body mechanics/ergonomics, assess and modify postural abnormalities and instruct in home and community integration to attain remaining goals. []  See Plan of Care  []  See progress note/recertification  []  See Discharge Summary         Progress towards goals / Updated goals:  Short Term Goals: To be accomplished in 2 weeks:                         1. The patient will be independent and compliant with HEP to maximize therapeutic benefit. - Met per patient report. 10/04/17                         2. The patient will improve quad strength to 5/5 MMT to maximize stability during ADLs. - MMT (L) Knee extension 4+/5 with distal quad pain. 10/20/2017  Long Term Goals: To be accomplished in 4 weeks:                         4. The patient will improve FOTO score to 60 to maximize quality of life. - Met, 72%. 10/20/2017                         2. The patient will display 8\" step up without evidence of instability or compensation for community negotiation. -not met; Pt able to perform 6 inch step up but continues to demonstrate instability. 10/18/17                          3. The patient will display effective squat form with no greater than 1/10 pain in order to improve work efficiency. Fair squat form void of pain 10/27/2017.                          4. The patient will improve hip ABD/ER hip strength to 4+/5 MMT to improve stability of knee during squatting to reduce knee pain. Progressed to 4+/5 MMT 10/27/2017.      PLAN  []  Upgrade activities as tolerated     [x]  Continue plan of care  []  Update interventions per flow sheet       []  Discharge due to:_  []  Other:_      Vimal Chavez, PT 10/27/2017  12:04 PM    Future Appointments  Date Time Provider June [place]   11/1/2017 3:00 PM Krishna Gutierrez, PTA [facility] 11/3/2017 3:00 PM Beth Jefferson PTA [facility]   11/8/2017 3:30 PM Bettina Miguel PTA [facility]   11/10/2017 9:30 AM Beth Jefferson PTA [facility]   11/21/2017 10:20 AM Anabela Plunkett MD [facility]   11/29/2017 11:30 AM Nichole English MD [facility]

## 2017-10-27 NOTE — PROGRESS NOTES
[facility]  [address]  [place], [address]  [phone] fax    Physical Therapy Progress Note  Patient name: Theresa Sanchez Start of Care: 10/2/2017   Referral source: Madyson Mireles MD : 1995                          Medical Diagnosis: Left knee pain [M25.562] Onset Date: 2017                          Treatment Diagnosis: L quad tendinosis   Prior Hospitalization: see medical history Provider#: [id number]   Medications: Verified on Patient summary List    Comorbidities: Arthritis, Latex Allergy   Prior Level of Function: The patient states that she had L knee pain in a chronic standpoint, but noted it became worse following the fall on her knee in July limiting stairs and squatting. Visits from Start of Care: 7    Missed Visits: 0    Key Functional Changes: The patient has progressed fairly well with regards to strength and squat form. She did indicate she returned to running somewhat since last treatment with fairly good tolerance. The patient will continue to benefit from 2 additional weeks of PT in order to progress to HEP at that time. Short Term Goals: To be accomplished in 2 weeks:                         1. The patient will be independent and compliant with HEP to maximize therapeutic benefit. - Met per patient report. 10/04/17                         2. The patient will improve quad strength to 5/5 MMT to maximize stability during ADLs. - MMT (L) Knee extension 4+/5 with distal quad pain. 10/20/2017  Long Term Goals: To be accomplished in 4 weeks:                         0. The patient will improve FOTO score to 60 to maximize quality of life. - Met, 72%. 10/20/2017                         2. The patient will display 8\" step up without evidence of instability or compensation for community negotiation. -not met; Pt able to perform 6 inch step up but continues to demonstrate instability. 10/18/17                          3. The patient will display effective squat form with no greater than 1/10 pain in order to improve work efficiency. Fair squat form void of pain 10/27/2017.                          4. The patient will improve hip ABD/ER hip strength to 4+/5 MMT to improve stability of knee during squatting to reduce knee pain. Progressed to 4+/5 MMT 10/27/2017. Updated Goals: to be achieved in 2 weeks:              1. The patient will display 8\" step up without evidence of instability or compensation for community negotiation. -not met; Pt able to perform 6 inch step up but continues to demonstrate instability. 10/18/17                          2. The patient will display effective squat form with no greater than 1/10 pain in order to improve work efficiency. Fair squat form void of pain 10/27/2017.                          3. The patient will improve hip ABD/ER hip strength to 4+/5 MMT to improve stability of knee during squatting to reduce knee pain. Progressed to 4+/5 MMT 10/27/2017. ASSESSMENT/RECOMMENDATIONS:  [x]Continue therapy per initial plan/protocol at a frequency of  2 x per week for 2 weeks  []Continue therapy with the following recommended changes:_____________________      _____________________________________________________________________  []Discontinue therapy progressing towards or have reached established goals  []Discontinue therapy due to lack of appreciable progress towards goals  []Discontinue therapy due to lack of attendance or compliance  []Await Physician's recommendations/decisions regarding therapy  []Other:________________________________________________________________    Thank you for this referral.    Nia Funez, PT 10/27/2017 12:56 PM  NOTE TO PHYSICIAN:  [address]   FAX TO [facility]: [phone]  If you are unable to process this request in 24 hours please contact our office: [phone]    ?  I have read the above report and request that my patient continue as recommended. ? I have read the above report and request that my patient continue therapy with the following changes/special instructions:__________________________________________________________  ? I have read the above report and request that my patient be discharged from therapy.     Physicians signature: ______________________________Date: ______Time:______

## 2017-10-27 NOTE — TELEPHONE ENCOUNTER
Pt stopped in stating she is having an allergic reaction to the Lodine. Pt states she had a rash and projectile vomiting. Pt states she only took the medication on Tuesday and has not taken it since. Pt would like to know if something else could be prescribed for her. Please advise pt at [phone].

## 2017-11-01 ENCOUNTER — HOSPITAL ENCOUNTER (OUTPATIENT)
Dept: PHYSICAL THERAPY | Age: 22
Discharge: HOME | End: 2017-11-01
Payer: COMMERCIAL

## 2017-11-01 PROCEDURE — 97112 NEUROMUSCULAR REEDUCATION: CPT

## 2017-11-01 PROCEDURE — 97110 THERAPEUTIC EXERCISES: CPT

## 2017-11-01 NOTE — PROGRESS NOTES
PT DAILY TREATMENT NOTE 12    Patient Name: Clover Roldan  Date:2017  : 1995  [x]  Patient  Verified  Payor: Delena Boeck / Plan: Bernadine Yang / Product Type: HMO /    In time:3:00  Out time:3:26  Total Treatment Time (min): 26  Visit #: 1 of 4    Treatment Area: Left knee pain [M25.562]    SUBJECTIVE  Pain Level (0-10 scale): 0/10  Any medication changes, allergies to medications, adverse drug reactions, diagnosis change, or new procedure performed?: [x] No    [] Yes (see summary sheet for update)  Subjective functional status/changes:   [] No changes reported  Pt reports no complaints of pain. Pt reports compliance with HEP. OBJECTIVE    16 min Therapeutic Exercise:  [x] See flow sheet :   Rationale: increase ROM and increase strength to improve the patients ability to tolerate ADLs. 10 min Neuromuscular Re-education:  [x]  See flow sheet :   Rationale: increase strength, improve coordination and improve balance  to improve the patients ability to tolerate ADLs. With   [] TE   [] TA   [] neuro   [] other: Patient Education: [x] Review HEP    [] Progressed/Changed HEP based on:   [] positioning   [] body mechanics   [] transfers   [] heat/ice application    [] other:      Other Objective/Functional Measures: mod vc's to shift weight onto her heels when performing KB lifts. Pain Level (0-10 scale) post treatment: 0/10    ASSESSMENT/Changes in Function: Pt demonstrates good form with most updated exercises post initial vc's. Patient will continue to benefit from skilled PT services to modify and progress therapeutic interventions, address functional mobility deficits, address ROM deficits, address strength deficits, analyze and address soft tissue restrictions, analyze and cue movement patterns and analyze and modify body mechanics/ergonomics to attain remaining goals.      []  See Plan of Care  []  See progress note/recertification  []  See Discharge Summary Progress towards goals / Updated goals:  Updated Goals: to be achieved in 2 weeks:                                   1. The patient will display 8\" step up without evidence of instability or compensation for community negotiation. -not met; Pt able to perform 6 inch step up but continues to demonstrate instability. 10/18/17                          2. The patient will display effective squat form with no greater than 1/10 pain in order to improve work efficiency. Fair squat form void of pain 10/27/2017.                          3. The patient will improve hip ABD/ER hip strength to 4+/5 MMT to improve stability of knee during squatting to reduce knee pain. Progressed to 4+/5 MMT 10/27/2017.      PLAN  []  Upgrade activities as tolerated     [x]  Continue plan of care  []  Update interventions per flow sheet       []  Discharge due to:_  []  Other:_      Kari Antonio PTA 11/1/2017  3:11 PM    Future Appointments  Date Time Provider June Ken   11/3/2017 3:00 PM Kari Antonio PTA [facility]   11/8/2017 3:30 PM [facility]   11/10/2017 9:30 AM [facility]   11/21/2017 10:20 AM Ximena Dupree MD [address]   11/29/2017 11:30 AM MD Bandar Gupta

## 2017-11-03 ENCOUNTER — HOSPITAL ENCOUNTER (OUTPATIENT)
Dept: PHYSICAL THERAPY | Age: 22
Discharge: HOME | End: 2017-11-03
Payer: COMMERCIAL

## 2017-11-03 PROCEDURE — 97110 THERAPEUTIC EXERCISES: CPT

## 2017-11-03 PROCEDURE — 97112 NEUROMUSCULAR REEDUCATION: CPT

## 2017-11-03 NOTE — PROGRESS NOTES
PT DAILY TREATMENT NOTE     Patient Name: Roya Ceballos  Date:11/3/2017  : 1995  [x]  Patient  Verified  Payor: Claudeen Lao / Plan: Latisha Arthur / Product Type: HMO /    In time:3:00  Out time:3:30  Total Treatment Time (min): 30  Visit #: 2 of 4    Treatment Area: Left knee pain [M25.562]    SUBJECTIVE  Pain Level (0-10 scale): 3/10  Any medication changes, allergies to medications, adverse drug reactions, diagnosis change, or new procedure performed?: [x] No    [] Yes (see summary sheet for update)  Subjective functional status/changes:   [] No changes reported  Pt reports no new complaints of pain. Pt reports compliance with HEP. OBJECTIVE    20 min Therapeutic Exercise:  [x] See flow sheet :   Rationale: increase ROM and increase strength to improve the patients ability to tolerate ADLs     10 min Neuromuscular Re-education:  [x]  See flow sheet :   Rationale: increase strength, improve coordination and increase proprioception  to improve the patients ability to perform functional activities. With   [] TE   [] TA   [] neuro   [] other: Patient Education: [x] Review HEP    [] Progressed/Changed HEP based on:   [] positioning   [] body mechanics   [] transfers   [] heat/ice application    [] other:      Other Objective/Functional Measures: Pt unable to perform KB squats due to pain. Pain Level (0-10 scale) post treatment: 0/10    ASSESSMENT/Changes in Function: Pt continues to have difficulty performing squats with proper form though she is given multiple vc's to correct. Patient will continue to benefit from skilled PT services to modify and progress therapeutic interventions, address functional mobility deficits, address ROM deficits, address strength deficits and analyze and address soft tissue restrictions to attain remaining goals.      []  See Plan of Care  []  See progress note/recertification  []  See Discharge Summary         Progress towards goals / Updated goals:  Updated Goals: to be achieved in 2 weeks:                                   1. The patient will display 8\" step up without evidence of instability or compensation for community negotiation. -not met; Pt able to perform 6 inch step up but continues to demonstrate instability. 10/18/17                          2. The patient will display effective squat form with no greater than 1/10 pain in order to improve work efficiency. Fair squat form void of pain 10/27/2017.                          3. The patient will improve hip ABD/ER hip strength to 4+/5 MMT to improve stability of knee during squatting to reduce knee pain. Progressed to 4+/5 MMT 10/27/2017.      PLAN  []  Upgrade activities as tolerated     [x]  Continue plan of care  []  Update interventions per flow sheet       []  Discharge due to:_  []  Other:_      Shaunna Jenkins PTA 11/3/2017  3:14 PM    Future Appointments  Date Time Provider June Ken   11/8/2017 3:30 PM [facility] Weathers [facility]   11/10/2017 9:30 AM [facility]   11/21/2017 10:20 AM MD Yousuf Lema [address]   11/29/2017 11:30 AM [facility]

## 2017-11-08 ENCOUNTER — APPOINTMENT (OUTPATIENT)
Dept: PHYSICAL THERAPY | Age: 22
End: 2017-11-08
Payer: COMMERCIAL

## 2017-11-10 ENCOUNTER — HOSPITAL ENCOUNTER (OUTPATIENT)
Dept: PHYSICAL THERAPY | Age: 22
Discharge: HOME | End: 2017-11-10
Payer: COMMERCIAL

## 2017-11-10 PROCEDURE — 97110 THERAPEUTIC EXERCISES: CPT

## 2017-11-10 PROCEDURE — 97112 NEUROMUSCULAR REEDUCATION: CPT

## 2017-11-10 NOTE — PROGRESS NOTES
PT DAILY TREATMENT NOTE 12    Patient Name: Lidia Dickerson  Date:11/10/2017  : 1995  [x]  Patient  Verified  Payor: Alejo Ayoub / Plan: Domenica Mitchell / Product Type: HMO /    In time:9:42  Out time:10:10  Total Treatment Time (min): 28  Visit #: 3 of 4    Treatment Area: Left knee pain [M25.562]    SUBJECTIVE  Pain Level (0-10 scale): 0/10  Any medication changes, allergies to medications, adverse drug reactions, diagnosis change, or new procedure performed?: [x] No    [] Yes (see summary sheet for update)  Subjective functional status/changes:   [] No changes reported  Pt reports no new complaints of pain. Pt reports compliance with HEP. OBJECTIVE    18 min Therapeutic Exercise:  [] See flow sheet :   Rationale: increase ROM and increase strength to improve the patients ability to tolerate ADLs. 10 min Neuromuscular Re-education:  []  See flow sheet :   Rationale: increase strength, improve coordination and increase proprioception  to improve the patients ability to perform functional activities. With   [] TE   [] TA   [] neuro   [] other: Patient Education: [x] Review HEP    [] Progressed/Changed HEP based on:   [] positioning   [] body mechanics   [] transfers   [] heat/ice application    [] other:      Other Objective/Functional Measures: Pt demonstrates improved functional mobility and strength. Pain Level (0-10 scale) post treatment: 0/10    ASSESSMENT/Changes in Function: Pt has made significant progress toward functional goals and reports no pain when working. Pt will complete 1 more PT session and then be DC'd to HEP. Patient will continue to benefit from skilled PT services to modify and progress therapeutic interventions, address functional mobility deficits, address ROM deficits, address strength deficits, analyze and address soft tissue restrictions, analyze and cue movement patterns and analyze and modify body mechanics/ergonomics to attain remaining goals. []  See Plan of Care  []  See progress note/recertification  []  See Discharge Summary         Progress towards goals / Updated goals:  Updated Goals: to be achieved in 2 weeks:                                   1. The patient will display 8\" step up without evidence of instability or compensation for community negotiation. -not met; Pt able to perform 6 inch step up but continues to demonstrate instability. 10/18/17                          2. The patient will display effective squat form with no greater than 1/10 pain in order to improve work efficiency. -Met 11/10/17.                          3. The patient will improve hip ABD/ER hip strength to 4+/5 MMT to improve stability of knee during squatting to reduce knee pain. Progressed to 4+/5 MMT 10/27/2017.      PLAN  []  Upgrade activities as tolerated     [x]  Continue plan of care  []  Update interventions per flow sheet       []  Discharge due to:_  []  Other:_      Corey Lackey, PTA 11/10/2017  9:55 AM    Future Appointments  Date Time Provider June Ken   11/21/2017 10:20 AM MD Yousuf Logan   11/29/2017 11:30 AM MD Bandar Portillo

## 2017-11-21 ENCOUNTER — OFFICE VISIT (OUTPATIENT)
Dept: ORTHOPEDIC SURGERY | Age: 22
End: 2017-11-21

## 2017-11-21 VITALS
OXYGEN SATURATION: 97 % | SYSTOLIC BLOOD PRESSURE: 108 MMHG | DIASTOLIC BLOOD PRESSURE: 89 MMHG | WEIGHT: 205 LBS | HEART RATE: 82 BPM | BODY MASS INDEX: 31.07 KG/M2 | HEIGHT: 68 IN | TEMPERATURE: 98.2 F

## 2017-11-21 DIAGNOSIS — M22.2X2 PATELLOFEMORAL SYNDROME, LEFT: Primary | ICD-10-CM

## 2017-11-21 NOTE — PATIENT INSTRUCTIONS
Patellofemoral Pain Syndrome: Care Instructions  Your Care Instructions  Patellofemoral pain syndrome is pain in the front of the knee. It is caused by overuse, weak thigh muscles (quadriceps), or a problem with the way the kneecap moves. Extra weight may also cause this syndrome. The patella is the kneecap, and the femur is the thighbone. Some people may have pain in the front of the knee from a condition called chondromalacia. In this problem, the underside of the knee cartilage wears down and frays. Cartilage is a rubbery tissue that cushions joints. In some cases, the kneecap does not move, or track, in a normal way. You may have knee pain when you run, walk down hills or steps, or do another activity. Sitting for a long time also can cause knee pain. Your knee pain may get better with medicines for pain and swelling and exercises to make your quadriceps stronger. Losing weight, if you need to, may also help with pain. Follow-up care is a key part of your treatment and safety. Be sure to make and go to all appointments, and call your doctor if you are having problems. It's also a good idea to know your test results and keep a list of the medicines you take. How can you care for yourself at home? · Ask your doctor if you can take an over-the-counter pain medicine, such as acetaminophen (Tylenol), ibuprofen (Advil, Motrin), or naproxen (Aleve). Be safe with medicines. Read and follow all instructions on the label. · Rest and protect your knee. Take a break from activities that cause pain, such as long periods of sitting or kneeling. · Put ice or a cold pack on your knee for 10 to 20 minutes after activity. Put a thin cloth between the ice and your skin. · If your doctor recommends an elastic bandage, sleeve, or other type of support for your knee, wear it as directed. · If your knee is not swollen, you can put moist heat, a heating pad, or a warm cloth on your knee.  After several days of rest, you can begin gentle exercise of your knee. · Reach and stay at a healthy weight. Being overweight puts stress on your knees. · Wear athletic shoes that offer good support, especially if you run. · Use shoe inserts, or orthotics, if they help reduce your knee pain. Many drugstores and shoe stores sell them. · See a physical therapist to learn more exercises and stretches to make your legs stronger. When should you call for help? Watch closely for changes in your health, and be sure to contact your doctor if:  ? · Your knee pain does not get better or gets worse. Where can you learn more? Go to http://fabian-zeny.info/. Enter X335 in the search box to learn more about \"Patellofemoral Pain Syndrome: Care Instructions. \"  Current as of: March 21, 2017  Content Version: 11.4  © 5195-1813 [employer], [employer]. Care instructions adapted under license by [employer] (which disclaims liability or warranty for this information). If you have questions about a medical condition or this instruction, always ask your healthcare professional. [address] any warranty or liability for your use of this information.

## 2017-11-21 NOTE — PROGRESS NOTES
Alea Rivas  1995   Chief Complaint   Patient presents with    Knee Pain     left knee pain        HISTORY OF PRESENT ILLNESS  Alea Rivas is a 25 y.o. female who presents today for reevaluation of left knee pain. At last OV, patient referred for course of PT and provided prescription for Lodine. She rates her pain 0/10 today. Has been attending PT. Feel she has had 100% improvement in her pain and swelling. Patient denies any fever, chills, chest pain, shortness of breath or calf pain. There are no new illness or injuries to report since last seen in the office. There are no changes to medications, allergies, family or social history. PHYSICAL EXAM:   Visit Vitals    /89 (BP 1 Location: Left arm, BP Patient Position: Sitting)    Pulse 82    Temp 98.2 °F (36.8 °C)    Ht 5' 8\" (1.727 m)    Wt 205 lb (93 kg)    SpO2 97%    BMI 31.17 kg/m2     The patient is a well-developed, well-nourished female   in no acute distress. The patient is alert and oriented times three. The patient is alert and oriented times three. Mood and affect are normal.  LYMPHATIC: lymph nodes are not enlarged and are within normal limits  SKIN: normal in color and non tender to palpation. There are no bruises or abrasions noted. NEUROLOGICAL: Motor sensory exam is within normal limits. Reflexes are equal bilaterally.  There is normal sensation to pinprick and light touch  MUSCULOSKELETAL:  Examination Left knee   Skin Intact   Range of motion 0-130   Effusion -   Medial joint line tenderness -   Lateral joint line tenderness -   Tenderness Pes Bursa -   Tenderness insertion MCL -   Tenderness insertion LCL -   Trinas -   Patella crepitus -   Patella grind -   Lachman -   Pivot shift -   Anterior drawer -   Posterior drawer -   Varus stress -   Valgus stress -   Neurovascular Intact   Calf Swelling and Tenderness to Palpation -   Cristiane's Test -   Hamstring Cord Tightness +        IMAGING:   MRI of the left knee dated 9/8/17 was reviewed and read:   IMPRESSION   Menisci and ligaments are intact. There may be mild distal quadriceps tendinosis.         IMPRESSION:      ICD-10-CM ICD-9-CM    1. Patellofemoral syndrome, left M22.2X2 719.46         PLAN:   1. Patient's left knee pain has improved with PT. Instructed her to continue to focus on the stretches. Activities as tolerated. Risk factors include: n/a  2. No cortisone injection indicated today   3. No Physical/Occupational Therapy indicated today  4. No diagnostic test indicated today  5. No durable medical equipment indicated today  6. No referral indicated today   7. No medications indicated today  8. No Narcotic indicated today     RTC PRN  Follow-up Disposition: Not on File    Scribed by Blair [address]) as dictated by Winston Huang MD    I, Dr. Winston Huang, confirm that all documentation is accurate.     Winston Huang M.D.   [facility]

## 2017-11-29 ENCOUNTER — OFFICE VISIT (OUTPATIENT)
Dept: OBGYN CLINIC | Age: 22
End: 2017-11-29

## 2017-11-29 VITALS
SYSTOLIC BLOOD PRESSURE: 130 MMHG | HEIGHT: 68 IN | HEART RATE: 99 BPM | WEIGHT: 205 LBS | BODY MASS INDEX: 31.07 KG/M2 | DIASTOLIC BLOOD PRESSURE: 85 MMHG

## 2017-11-29 DIAGNOSIS — Z23 ENCOUNTER FOR IMMUNIZATION: ICD-10-CM

## 2017-11-29 DIAGNOSIS — E66.9 OBESITY (BMI 30-39.9): ICD-10-CM

## 2017-11-29 DIAGNOSIS — Z01.419 WELL WOMAN EXAM WITH ROUTINE GYNECOLOGICAL EXAM: Primary | ICD-10-CM

## 2017-11-29 RX ORDER — DOXYCYCLINE 100 MG/1
CAPSULE ORAL
COMMUNITY
Start: 2017-11-21

## 2017-11-29 NOTE — PATIENT INSTRUCTIONS

## 2017-11-29 NOTE — PROGRESS NOTES
Name: Sherryle Marsh MRN: [id number]    YOB: 1995  Age: 25 y.o. Sex: female        Chief Complaint   Patient presents with   [address] [facility] Well Woman       HPI  Denies depression  Does eat a well balanced diet  Does exercise nearly every day  Denies domestic abuse  Last tdap UTD  Flu vaccine today  HPV vaccine did the 1st one she thinks, will check her records    OB History      Para Term  AB Living    0 0 0 0 0 0    SAB TAB Ectopic Molar Multiple Live Births    0 0 0  0         Obstetric Comments        Periods irregular, using Mirena  History of sexually transmitted infections no  Last pap never               History   Sexual Activity    Sexual activity: Yes       History reviewed. No pertinent past medical history. History reviewed. No pertinent surgical history. Allergies   Allergen Reactions    Latex, Natural Rubber Not Reported This Time    Lodine [Etodolac] Rash and Nausea and Vomiting    Ciprofloxacin Shortness of Breath       Current Outpatient Prescriptions on File Prior to Visit   Medication Sig Dispense Refill    levonorgestrel (MIRENA) 20 mcg/24 hr (5 years) IUD 1 Each by IntraUTERine route once. No current facility-administered medications on file prior to visit. Social History     Social History    Marital status: SINGLE     Spouse name: N/A    Number of children: N/A    Years of education: N/A     Occupational History    Not on file. Social History Main Topics    Smoking status: Never Smoker    Smokeless tobacco: Never Used    Alcohol use Yes    Drug use: No    Sexual activity: Yes     Other Topics Concern    Not on file     Social History Narrative       Family History   Problem Relation Age of Onset    Seizures Mother     Hypertension Father     COPD Paternal Grandmother        Review of Systems   Constitutional: Negative. HENT: Positive for congestion. Respiratory: Positive for cough, sputum production and shortness of breath. Cardiovascular: Negative. Gastrointestinal: Negative. Genitourinary: Negative. Musculoskeletal: Negative. Skin: Negative. Neurological: Negative. Psychiatric/Behavioral: Negative. Visit Vitals    /85 (BP 1 Location: Left arm, BP Patient Position: Sitting)    Pulse 99    Ht 5' 8\" (1.727 m)    Wt 205 lb (93 kg)    LMP 11/21/2017    BMI 31.17 kg/m2       GENERAL:  Well developed, well nourished, in no distress  NEURO/PSYCHE: Grossly intact, normal mood and affect  HEENT: Normal cephalic, atraumatic, good dentition, neck supple. No thyromegaly  BREASTS: breasts appear normal, no suspicious masses, no skin or nipple changes  CV: regular rate and rhythm  LUNGS: clear to auscultation bilaterally, no wheezes, rhonchi or rales, good air entry with normal effort  ABDOMEN: + BS, soft without tenderness, no guarding, rebound or masses  EXTREMITIES: no edema or erythema noted  SKIN:  Warm, dry, no lesions  LYMPHATICS: No supraclavicular, axillary or inguinal nodes noted    PELVIC EXAM:  LABIA MAJORA: no masses, tenderness or lesions  LABIA MINORA: no masses, tenderness or lesions  CLITORIS: no masses, tenderness or lesions  URETHRA: normal appearing, no masses or tenderness  BLADDER: no fullness or tenderness  VAGINA: pink appearing vagina with physiologic discharge, no lesions   PERINEUM: no masses, tenderness or lesions  CERVIX: No CMT or lesions  UTERUS: small, mobile, nontender  ADNEXA: nontender and no masses    1. Well woman exam with routine gynecological exam  Reviewed diet, weight loss, exercise, and domestic abuse. Encouraged condom use every time. Reviewed tetanus vaccine. All of her questions were discussed. - PAP IG, CT-NG-TV, RFX APTIMA HPV ASCUS ([id number]); Future    2. Obesity (BMI 30-39. 9)  Discussed The Obesity Code, The Complete Guide Fasting        F/U PRN

## 2017-12-02 LAB
C TRACH RRNA CVX QL NAA+PROBE: NEGATIVE
CYTOLOGIST CVX/VAG CYTO: NORMAL
CYTOLOGY CVX/VAG DOC THIN PREP: NORMAL
DX ICD CODE: NORMAL
LABCORP, 190119: NORMAL
Lab: NORMAL
N GONORRHOEA RRNA CVX QL NAA+PROBE: NEGATIVE
OTHER STN SPEC: NORMAL
PATH REPORT.FINAL DX SPEC: NORMAL
STAT OF ADQ CVX/VAG CYTO-IMP: NORMAL
T VAGINALIS RRNA SPEC QL NAA+PROBE: NEGATIVE

## 2018-01-16 NOTE — PROGRESS NOTES
[facility] [address]  [place], [address]  [phone] fax    Physical Therapy Discharge Summary  Patient name: Severa Slovak Start of Care: 10/2/2017   Referral source: Melissa Maharaj MD : 1995                          Medical Diagnosis: Left knee pain [M25.562] Onset Date: 2017                          Treatment Diagnosis: L quad tendinosis   Prior Hospitalization: see medical history Provider#: [id number]   Medications: Verified on Patient summary List    Comorbidities: Arthritis, Latex Allergy   Prior Level of Function: The patient states that she had L knee pain in a chronic standpoint, but noted it became worse following the fall on her knee in July limiting stairs and squatting. Visits from Start of Care: 10    Missed Visits: 1  Reporting Period : 10/27/2017 to 11/10/2017    Summary of Care:  Updated Goals: to be achieved in 2 weeks:                                   1. The patient will display 8\" step up without evidence of instability or compensation for community negotiation. -not met; Pt able to perform 6 inch step up but continues to demonstrate instability. 10/18/17                          2. The patient will display effective squat form with no greater than 1/10 pain in order to improve work efficiency. -Met 11/10/17.                          3. The patient will improve hip ABD/ER hip strength to 4+/5 MMT to improve stability of knee during squatting to reduce knee pain. Progressed to 4+/5 MMT 10/27/2017.        ASSESSMENT/RECOMMENDATIONS: per communication log, cleared by doctor, okay to D/C.   [x]Discontinue therapy:     Mayra Welch, PT 2018 7:38 AM

## 2018-09-06 ENCOUNTER — OFFICE VISIT (OUTPATIENT)
Dept: OBGYN CLINIC | Age: 23
End: 2018-09-06

## 2018-09-06 VITALS
WEIGHT: 215 LBS | HEIGHT: 68 IN | BODY MASS INDEX: 32.58 KG/M2 | HEART RATE: 98 BPM | DIASTOLIC BLOOD PRESSURE: 80 MMHG | SYSTOLIC BLOOD PRESSURE: 126 MMHG | OXYGEN SATURATION: 98 % | TEMPERATURE: 99.3 F | RESPIRATION RATE: 18 BRPM

## 2018-09-06 DIAGNOSIS — N89.8 VAGINAL DISCHARGE: ICD-10-CM

## 2018-09-06 DIAGNOSIS — R10.2 PELVIC PAIN: Primary | ICD-10-CM

## 2018-09-06 LAB
BILIRUB UR QL STRIP: NEGATIVE
GLUCOSE UR-MCNC: NEGATIVE MG/DL
KETONES P FAST UR STRIP-MCNC: NEGATIVE MG/DL
PH UR STRIP: 8.5 [PH] (ref 4.6–8)
PROT UR QL STRIP: ABNORMAL
SP GR UR STRIP: 1.02 (ref 1–1.03)
UA UROBILINOGEN AMB POC: ABNORMAL (ref 0.2–1)
URINALYSIS CLARITY POC: CLEAR
URINALYSIS COLOR POC: YELLOW
URINE BLOOD POC: NEGATIVE
URINE LEUKOCYTES POC: ABNORMAL
URINE NITRITES POC: NEGATIVE

## 2018-09-06 NOTE — MR AVS SNAPSHOT
[address] [place] [address] 804, [address] [address] [address] 
[phone] Patient: Hortensia Alexander MRN: [id number] ZPM:[birth year] Visit Information Date & Time Provider Department Dept. Phone Encounter #  
 9/6/2018  4:15 PM Martina Donohue MD [address] [phone] [id number] Follow-up Instructions Return in about 2 weeks (around 9/20/2018). Upcoming Health Maintenance Date Due  
 HPV Age 9Y-34Y (1 of 3 - Female 3 Dose Series) 9/5/2007 Influenza Age 5 to Adult 8/1/2018 PAP AKA CERVICAL CYTOLOGY 11/29/2020 Allergies as of 9/6/2018  Review Complete On: 9/6/2018 By: Martina Donohue MD  
  
 Severity Noted Reaction Type Reactions Latex, Natural Rubber  10/02/2017    Not Reported This Time Lodine [Etodolac] High 10/27/2017    Rash, Nausea and Vomiting Ciprofloxacin  02/17/2015    Shortness of Breath Current Immunizations  Reviewed on 11/29/2017 Name Date Influenza Vaccine (Quad) PF 11/29/2017 Not reviewed this visit You Were Diagnosed With   
  
 Codes Comments Pelvic pain    -  Primary ICD-10-CM: R10.2 ICD-9-CM: LFF1357 Vaginal discharge     ICD-10-CM: N89.8 ICD-9-CM: 623.5 Vitals BP Pulse Temp Resp Height(growth percentile) Weight(growth percentile) 126/80 (BP 1 Location: Left arm, BP Patient Position: Sitting) 98 99.3 °F (37.4 °C) (Oral) 18 5' 8\" (1.727 m) 215 lb (97.5 kg) SpO2 BMI OB Status Smoking Status 98% 32.69 kg/m2 IUD Never Smoker Vitals History BMI and BSA Data Body Mass Index Body Surface Area  
 32.69 kg/m 2 2.16 m 2 Your Updated Medication List  
  
   
This list is accurate as of 9/6/18  5:14 PM.  Always use your most recent med list.  
  
  
  
  
 doxycycline 100 mg capsule Commonly known as:  VIBRAMYCIN  
  
 levonorgestrel 20 mcg/24 hr (5 years) Iud  
Commonly known as:  MIRENA  
1 Each by IntraUTERine route once. We Performed the Following AMB POC URINALYSIS DIP STICK AUTO W/O MICRO [98689 CPT(R)] BETA HCG, QT Y948364 CPT(R)] CULTURE, URINE [id number] CPT(R)] NUSWAB VAGINITIS PLUS (VG+) [id number] Custom] Follow-up Instructions Return in about 2 weeks (around 9/20/2018). To-Do List   
 09/06/2018 Imaging:  US TRANSVAGINAL Patient Instructions Pelvic Pain: Care Instructions Your Care Instructions Pelvic pain, or pain in the lower belly, can have many causes. Often pelvic pain is not serious and gets better in a few days. If your pain continues or gets worse, you may need tests and treatment. Tell your doctor about any new symptoms. These may be signs of a serious problem. Follow-up care is a key part of your treatment and safety. Be sure to make and go to all appointments, and call your doctor if you are having problems. It's also a good idea to know your test results and keep a list of the medicines you take. How can you care for yourself at home? · Rest until you feel better. Lie down, and raise your legs by placing a pillow under your knees. · Drink plenty of fluids. You may find that small, frequent sips are easier on your stomach than if you drink a lot at once. Avoid drinks with carbonation or caffeine, such as soda pop, tea, or coffee. · Try eating several small meals instead of 2 or 3 large ones. Eat mild foods, such as rice, dry toast or crackers, bananas, and applesauce. Avoid fatty and spicy foods, other fruits, and alcohol until 48 hours after your symptoms have gone away. · Take an over-the-counter pain medicine, such as acetaminophen (Tylenol), ibuprofen (Advil, Motrin), or naproxen (Aleve). Read and follow all instructions on the label. · Do not take two or more pain medicines at the same time unless the doctor told you to. Many pain medicines have acetaminophen, which is Tylenol. Too much acetaminophen (Tylenol) can be harmful. · You can put a heating pad, a warm cloth, or moist heat on your belly to relieve pain. When should you call for help? Call your doctor now or seek immediate medical care if: 
  · You have a new or higher fever.  
  · You have unusual vaginal bleeding.  
  · You have new or worse belly or pelvic pain.  
  · You have vaginal discharge that has increased in amount or smells bad.  
 Watch closely for changes in your health, and be sure to contact your doctor if: 
  · You do not get better as expected. Where can you learn more? Go to http://fabian-zeny.info/. Enter [phone] in the search box to learn more about \"Pelvic Pain: Care Instructions. \" Current as of: October 6, 2017 Content Version: 11.7 © 8778-0640 Biotz. Care instructions adapted under license by Kids Movie (which disclaims liability or warranty for this information). If you have questions about a medical condition or this instruction, always ask your healthcare professional. [place] [address] any warranty or liability for your use of this information. Introducing [facility] & HEALTH SERVICES! [employer] introduces Mindie patient portal. Now you can access parts of your medical record, email your doctor's office, and request medication refills online. 1. In your internet browser, go to [email or web address] 2. Click on the First Time User? Click Here link in the Sign In box. You will see the New Member Sign Up page. 3. Enter your Mindie Access Code exactly as it appears below. You will not need to use this code after youve completed the sign-up process. If you do not sign up before the expiration date, you must request a new code. · Mindie Access Code: [email or web address] Expires: 12/5/2018  5:14 PM 
 
4. Enter the last four digits of your Social Security Number (xxxx) and Date of Birth (mm/dd/yyyy) as indicated and click Submit.  You will be taken to the next sign-up page. 5. Create a auctionPAL ID. This will be your auctionPAL login ID and cannot be changed, so think of one that is secure and easy to remember. 6. Create a auctionPAL password. You can change your password at any time. 7. Enter your Password Reset Question and Answer. This can be used at a later time if you forget your password. 8. Enter your e-mail address. You will receive e-mail notification when new information is available in [address] 9. Click Sign Up. You can now view and download portions of your medical record. 10. Click the Download Summary menu link to download a portable copy of your medical information. If you have questions, please visit the Frequently Asked Questions section of the auctionPAL website. Remember, auctionPAL is NOT to be used for urgent needs. For medical emergencies, dial 911. Now available from your iPhone and Android! Please provide this summary of care documentation to your next provider. Your primary care clinician is listed as Stuart Story. If you have any questions after today's visit, please call [phone].

## 2018-09-06 NOTE — PROGRESS NOTES
Name: Luisito Baxter MRN: [id number]    YOB: 1995  Age: 26 y.o. Sex: female        Chief Complaint   Patient presents with    Abdominal Pain     c/o having lower abdominal pain for 3 months which worsened in the past 3 nights. pt has an IUD in for 3 years       HPI 22G0 LMP unknown Mirena IUD in place presents c/o intermittent lower abdominal/pelvic pain for 3 months which has worsened in the last 3 days. She denies f/c, abnormal vaginal discharge, diarrhea, constipation of urinary symptoms. Her pain is also associated with nausea. Her periods have been irregular since Mirena was placed in . She was previously on depo-provera. Last time she had vaginal bleeding she thinks was 6 months ago. She is currently sexually active with long time partner of 5yrs and does not use condoms. She took a pregnancy test 3 days ago which was negative. OB History      Para Term  AB Living    0 0 0 0 0 0    SAB TAB Ectopic Molar Multiple Live Births    0 0 0  0         Obstetric Comments        Periods irregular, using Mirena  History of sexually transmitted infections no  Last pap 2017 NILM               PGyn    History   Sexual Activity    Sexual activity: Yes    Partners: Male    Birth control/ protection: IUD, None         Past Medical History:   Diagnosis Date    Migraine headache with aura        History reviewed. No pertinent surgical history. Allergies   Allergen Reactions    Latex, Natural Rubber Not Reported This Time    Lodine [Etodolac] Rash and Nausea and Vomiting    Ciprofloxacin Shortness of Breath       Current Outpatient Prescriptions on File Prior to Visit   Medication Sig Dispense Refill    levonorgestrel (MIRENA) 20 mcg/24 hr (5 years) IUD 1 Each by IntraUTERine route once.  doxycycline (VIBRAMYCIN) 100 mg capsule        No current facility-administered medications on file prior to visit. Review of Systems   Constitutional: Negative.     HENT: Negative. Eyes: Negative. Respiratory: Negative. Cardiovascular: Negative. Gastrointestinal: Negative. Genitourinary: Negative. Musculoskeletal: Negative. Neurological: Negative. Endo/Heme/Allergies: Negative. Visit Vitals    /80 (BP 1 Location: Left arm, BP Patient Position: Sitting)    Pulse 98    Temp 99.3 °F (37.4 °C) (Oral)    Resp 18    Ht 5' 8\" (1.727 m)    Wt 215 lb (97.5 kg)    SpO2 98%    BMI 32.69 kg/m2       GENERAL:  Well developed, well nourished, in no distress  Abdomen: soft, ND, TTP in LLQ and suprapubic tenderness. No rebound tenderness or gaurding. PELVIC EXAM:  LABIA MAJORA: no masses, tenderness or lesions  LABIA MINORA: no masses, tenderness or lesions  CLITORIS: no masses, tenderness or lesions  URETHRA: normal appearing, no masses or tenderness  BLADDER: no fullness or tenderness  VAGINA: pink appearing vagina with brown/yellowish discharge,  no lesions, tenderness along the anterior vagina  PERINEUM: no masses, tenderness or lesions  CERVIX: No CMT or lesions, strings visualized and palpated. UTERUS: small, mobile, nontender  ADNEXA: L adnexal tenderness, and no masses      Recent Results (from the past 24 hour(s))   AMB POC URINALYSIS DIP STICK AUTO W/O MICRO    Collection Time: 09/06/18  5:10 AM   Result Value Ref Range    Color (UA POC) Yellow     Clarity (UA POC) Clear     Glucose (UA POC) Negative Negative    Bilirubin (UA POC) Negative Negative    Ketones (UA POC) Negative Negative    Specific gravity (UA POC) 1.020 1.001 - 1.035    Blood (UA POC) Negative Negative    pH (UA POC) 8.5 (A) 4.6 - 8.0    Protein (UA POC) Trace Negative    Urobilinogen (UA POC) 0.2 mg/dL 0.2 - 1    Nitrites (UA POC) Negative Negative    Leukocyte esterase (UA POC) Trace Negative       1. Pelvic pain  Discussed possible etiologies, including ovarian cysts, possible ovarian cyst rupture, PID, endometritis, or cystitis. Will r/o infection, nuswab sent.  [facility] also sent to r/o possible early pregnancy, although unlikely given recent negative pregnancy test. Will also get a U/A to r/o UTI. And ultrasound to r/o structural causes. Answered all of her questions. Discussed going to the ED if symptoms continue to worsen over the weekend. - NUSWAB VAGINITIS PLUS (VG+); Future  - NUSWAB VAGINITIS PLUS (VG+)  - AMB POC URINALYSIS DIP STICK AUTO W/O MICRO  - BETA HCG, QT; Future  - US TRANSVAGINAL; Future  - BETA HCG, QT  - CULTURE, URINE; Future  - CULTURE, URINE      2. Vaginal discharge  Possibly old blood. Nu swab sent  - NUSWAB VAGINITIS PLUS (VG+);  Future  - NUSWAB VAGINITIS PLUS (VG+)        F/U 2-3 weeks

## 2018-09-06 NOTE — PATIENT INSTRUCTIONS
Pelvic Pain: Care Instructions  Your Care Instructions    Pelvic pain, or pain in the lower belly, can have many causes. Often pelvic pain is not serious and gets better in a few days. If your pain continues or gets worse, you may need tests and treatment. Tell your doctor about any new symptoms. These may be signs of a serious problem. Follow-up care is a key part of your treatment and safety. Be sure to make and go to all appointments, and call your doctor if you are having problems. It's also a good idea to know your test results and keep a list of the medicines you take. How can you care for yourself at home? · Rest until you feel better. Lie down, and raise your legs by placing a pillow under your knees. · Drink plenty of fluids. You may find that small, frequent sips are easier on your stomach than if you drink a lot at once. Avoid drinks with carbonation or caffeine, such as soda pop, tea, or coffee. · Try eating several small meals instead of 2 or 3 large ones. Eat mild foods, such as rice, dry toast or crackers, bananas, and applesauce. Avoid fatty and spicy foods, other fruits, and alcohol until 48 hours after your symptoms have gone away. · Take an over-the-counter pain medicine, such as acetaminophen (Tylenol), ibuprofen (Advil, Motrin), or naproxen (Aleve). Read and follow all instructions on the label. · Do not take two or more pain medicines at the same time unless the doctor told you to. Many pain medicines have acetaminophen, which is Tylenol. Too much acetaminophen (Tylenol) can be harmful. · You can put a heating pad, a warm cloth, or moist heat on your belly to relieve pain. When should you call for help?   Call your doctor now or seek immediate medical care if:    · You have a new or higher fever.     · You have unusual vaginal bleeding.     · You have new or worse belly or pelvic pain.     · You have vaginal discharge that has increased in amount or smells bad.    Watch closely for changes in your health, and be sure to contact your doctor if:    · You do not get better as expected. Where can you learn more? Go to http://fabian-zeny.info/. Enter [phone] in the search box to learn more about \"Pelvic Pain: Care Instructions. \"  Current as of: October 6, 2017  Content Version: 11.7  © 5729-6848 Sprooki. Care instructions adapted under license by PlaySay (which disclaims liability or warranty for this information). If you have questions about a medical condition or this instruction, always ask your healthcare professional. [place] [address] any warranty or liability for your use of this information.

## 2018-09-12 LAB
A VAGINAE DNA VAG QL NAA+PROBE: NORMAL SCORE
BACTERIA UR CULT: ABNORMAL
BVAB2 DNA VAG QL NAA+PROBE: NORMAL SCORE
C ALBICANS DNA VAG QL NAA+PROBE: NEGATIVE
C GLABRATA DNA VAG QL NAA+PROBE: NEGATIVE
C TRACH RRNA SPEC QL NAA+PROBE: NEGATIVE
HCG INTACT+B SERPL-ACNC: <1 MIU/ML
MEGA1 DNA VAG QL NAA+PROBE: NORMAL SCORE
N GONORRHOEA RRNA SPEC QL NAA+PROBE: NEGATIVE
T VAGINALIS RRNA SPEC QL NAA+PROBE: NEGATIVE

## 2018-09-17 ENCOUNTER — HOSPITAL ENCOUNTER (OUTPATIENT)
Dept: ULTRASOUND IMAGING | Age: 23
Discharge: HOME | End: 2018-09-17
Attending: OBSTETRICS & GYNECOLOGY
Payer: COMMERCIAL

## 2018-09-17 ENCOUNTER — TELEPHONE (OUTPATIENT)
Dept: OBGYN CLINIC | Age: 23
End: 2018-09-17

## 2018-09-17 DIAGNOSIS — N30.00 ACUTE CYSTITIS WITHOUT HEMATURIA: Primary | ICD-10-CM

## 2018-09-17 DIAGNOSIS — R10.2 PELVIC PAIN: ICD-10-CM

## 2018-09-17 PROCEDURE — 76830 TRANSVAGINAL US NON-OB: CPT

## 2018-09-17 RX ORDER — SULFAMETHOXAZOLE AND TRIMETHOPRIM 800; 160 MG/1; MG/1
1 TABLET ORAL 2 TIMES DAILY
Qty: 20 TAB | Refills: 0 | Status: SHIPPED | OUTPATIENT
Start: 2018-09-17 | End: 2018-09-27

## 2018-09-17 NOTE — TELEPHONE ENCOUNTER
----- Message from Mame Adams MD sent at 9/14/2018  1:58 PM EDT ----- Unable to send prescriptions to patient pharmacy because no pharmacy listed

## 2018-09-17 NOTE — TELEPHONE ENCOUNTER
Received a call from the pt using two identifiers, name and . Pt stated that she was at the pharmacy and the medication is not there. Pt made aware that the message was sent to the provider and that she was not in the office but on call at the hospital. Pt also advised to call the pharmacy before going to make sure the medication is there and that it can  take 24-48 hours. Pt upset and stated that she works all the time and the only reason she wanted it sent to that pharmacy is because she was going out to the hospital there. She also asked if I could have the provider call her and that she will be calling customer service. I asked her if she would like to speak with the manager, she said yes. The manager was at lunch and the pt made aware that she could leave a voice mail and she would call her back. Pt stated that she would just call back, I verbalized understanding. Message forwarded to the provider and Manager for review.

## 2018-09-17 NOTE — TELEPHONE ENCOUNTER
Call made to the pt using two identifiers, name and . Pt made aware that her urine was positive for UTI but no pharmacy was on file to send in medications. Pt stated that she just moved and will call us back with the pharmacy. I verbalized understanding.

## 2018-10-05 ENCOUNTER — OFFICE VISIT (OUTPATIENT)
Dept: OBGYN CLINIC | Age: 23
End: 2018-10-05

## 2018-10-05 VITALS
BODY MASS INDEX: 32.61 KG/M2 | SYSTOLIC BLOOD PRESSURE: 124 MMHG | WEIGHT: 215.2 LBS | TEMPERATURE: 98 F | DIASTOLIC BLOOD PRESSURE: 86 MMHG | HEIGHT: 68 IN | HEART RATE: 79 BPM | OXYGEN SATURATION: 97 %

## 2018-10-05 DIAGNOSIS — N30.00 ACUTE CYSTITIS WITHOUT HEMATURIA: ICD-10-CM

## 2018-10-05 DIAGNOSIS — N83.202 CYST OF LEFT OVARY: ICD-10-CM

## 2018-10-05 DIAGNOSIS — R10.2 PELVIC PAIN: Primary | ICD-10-CM

## 2018-10-05 NOTE — MR AVS SNAPSHOT
Quintin [facility]pta. [facility] [address], [address] [facility] [address] 
[phone] Patient: Art Emanuel MRN: [id number] SPR:[birth year] Visit Information Date & Time Provider Department Dept. Phone Encounter #  
 10/5/2018  9:15 AM Ivonne Singletary MD [facility] [phone] [id number] Upcoming Health Maintenance Date Due  
 HPV Age 9Y-34Y (1 of 3 - Female 3 Dose Series) 9/5/2007 Influenza Age 5 to Adult 8/1/2018 PAP AKA CERVICAL CYTOLOGY 11/29/2020 Allergies as of 10/5/2018  Review Complete On: 10/5/2018 By: Ross Griffith LPN Severity Noted Reaction Type Reactions Latex, Natural Rubber  10/02/2017    Not Reported This Time Lodine [Etodolac] High 10/27/2017    Rash, Nausea and Vomiting Ciprofloxacin  02/17/2015    Shortness of Breath Current Immunizations  Reviewed on 11/29/2017 Name Date Influenza Vaccine (Quad) PF 11/29/2017 Not reviewed this visit Vitals BP Pulse Temp Height(growth percentile) Weight(growth percentile) SpO2  
 124/86 79 98 °F (36.7 °C) 5' 8\" (1.727 m) 215 lb 3.2 oz (97.6 kg) 97% BMI OB Status Smoking Status 32.72 kg/m2 IUD Never Smoker BMI and BSA Data Body Mass Index Body Surface Area 32.72 kg/m 2 2.16 m 2 Preferred Pharmacy Pharmacy Name Phone [facility], [address] [place] [address] [phone] Your Updated Medication List  
  
   
This list is accurate as of 10/5/18  9:54 AM.  Always use your most recent med list.  
  
  
  
  
 doxycycline 100 mg capsule Commonly known as:  VIBRAMYCIN  
  
 levonorgestrel 20 mcg/24 hr (5 years) Iud  
Commonly known as:  MIRENA  
1 Each by IntraUTERine route once. Introducing [facility] & HEALTH SERVICES!    
 [employer] introduces Matchmaker Videos patient portal. Now you can access parts of your medical record, email your doctor's office, and request medication refills online. 1. In your internet browser, go to [email or web address] 2. Click on the First Time User? Click Here link in the Sign In box. You will see the New Member Sign Up page. 3. Enter your [employer] Access Code exactly as it appears below. You will not need to use this code after youve completed the sign-up process. If you do not sign up before the expiration date, you must request a new code. · [employer] Access Code: [email or web address] Expires: 12/5/2018  5:14 PM 
 
4. Enter the last four digits of your Social Security Number (xxxx) and Date of Birth (mm/dd/yyyy) as indicated and click Submit. You will be taken to the next sign-up page. 5. Create a Biosc[employer] ID. This will be your [employer] login ID and cannot be changed, so think of one that is secure and easy to remember. 6. Create a [employer] password. You can change your password at any time. 7. Enter your Password Reset Question and Answer. This can be used at a later time if you forget your password. 8. Enter your e-mail address. You will receive e-mail notification when new information is available in [address] 9. Click Sign Up. You can now view and download portions of your medical record. 10. Click the Download Summary menu link to download a portable copy of your medical information. If you have questions, please visit the Frequently Asked Questions section of the [employer] website. Remember, [employer] is NOT to be used for urgent needs. For medical emergencies, dial 911. Now available from your iPhone and Android! Please provide this summary of care documentation to your next provider. Your primary care clinician is listed as NONE. If you have any questions after today's visit, please call [phone].

## 2018-10-05 NOTE — PATIENT INSTRUCTIONS
Functional Ovarian Cyst: Care Instructions  Your Care Instructions    A functional ovarian cyst is a sac that forms on the surface of a woman's ovary during ovulation. The sac holds a maturing egg. Usually the sac goes away after the egg is released. But if the egg is not released, or if the sac closes up after the egg is released, the sac can swell up with fluid and form a cyst.  Functional ovarian cysts are different than ovarian growths caused by other problems, such as cancer. Most functional ovarian cysts cause no symptoms and go away on their own. Some cause mild pain. Others can cause severe pain when they rupture or bleed. Follow-up care is a key part of your treatment and safety. Be sure to make and go to all appointments, and call your doctor if you are having problems. It's also a good idea to know your test results and keep a list of the medicines you take. How can you care for yourself at home? · Use heat, such as a hot water bottle, a heating pad set on low, or a warm bath, to relax tense muscles and relieve cramping. · Be safe with medicines. Take pain medicines exactly as directed. ¨ If the doctor gave you a prescription medicine for pain, take it as prescribed. ¨ If you are not taking a prescription pain medicine, ask your doctor if you can take an over-the-counter medicine. · Avoid constipation. Make sure you drink enough fluids and include fruits, vegetables, and fiber in your diet each day. Constipation does not cause ovarian cysts, but it may make your pelvic pain worse. When should you call for help? Call your doctor now or seek immediate medical care if:    · You have severe vaginal bleeding.     · You have new or worse belly or pelvic pain.    Watch closely for changes in your health, and be sure to contact your doctor if:    · You have unusual vaginal bleeding.     · You do not get better as expected. Where can you learn more?   Go to http://fabian-zeny.info/. Enter T561 in the search box to learn more about \"Functional Ovarian Cyst: Care Instructions. \"  Current as of: May 15, 2018  Content Version: 11.8  © 4552-4594 Submittable. Care instructions adapted under license by [employer] (which disclaims liability or warranty for this information). If you have questions about a medical condition or this instruction, always ask your healthcare professional. [address] any warranty or liability for your use of this information. Pelvic Pain: Care Instructions  Your Care Instructions    Pelvic pain, or pain in the lower belly, can have many causes. Often pelvic pain is not serious and gets better in a few days. If your pain continues or gets worse, you may need tests and treatment. Tell your doctor about any new symptoms. These may be signs of a serious problem. Follow-up care is a key part of your treatment and safety. Be sure to make and go to all appointments, and call your doctor if you are having problems. It's also a good idea to know your test results and keep a list of the medicines you take. How can you care for yourself at home? · Rest until you feel better. Lie down, and raise your legs by placing a pillow under your knees. · Drink plenty of fluids. You may find that small, frequent sips are easier on your stomach than if you drink a lot at once. Avoid drinks with carbonation or caffeine, such as soda pop, tea, or coffee. · Try eating several small meals instead of 2 or 3 large ones. Eat mild foods, such as rice, dry toast or crackers, bananas, and applesauce. Avoid fatty and spicy foods, other fruits, and alcohol until 48 hours after your symptoms have gone away. · Take an over-the-counter pain medicine, such as acetaminophen (Tylenol), ibuprofen (Advil, Motrin), or naproxen (Aleve). Read and follow all instructions on the label.   · Do not take two or more pain medicines at the same time unless the doctor told you to. Many pain medicines have acetaminophen, which is Tylenol. Too much acetaminophen (Tylenol) can be harmful. · You can put a heating pad, a warm cloth, or moist heat on your belly to relieve pain. When should you call for help? Call your doctor now or seek immediate medical care if:    · You have a new or higher fever.     · You have unusual vaginal bleeding.     · You have new or worse belly or pelvic pain.     · You have vaginal discharge that has increased in amount or smells bad.    Watch closely for changes in your health, and be sure to contact your doctor if:    · You do not get better as expected. Where can you learn more? Go to http://fabian-zeny.info/. Enter 517-620-548 in the search box to learn more about \"Pelvic Pain: Care Instructions. \"  Current as of: May 15, 2018  Content Version: 11.8  © 6088-4236 [employer]. Care instructions adapted under license by [employer] (which disclaims liability or warranty for this information). If you have questions about a medical condition or this instruction, always ask your healthcare professional. [place] [address] any warranty or liability for your use of this information.

## 2018-10-05 NOTE — PROGRESS NOTES
Name: Lena Rodas MRN: [id number]    YOB: 1995  Age: 26 y.o. Sex: female        Chief Complaint   Patient presents with    Results     u/s       HPI  22G0 LMP unknown Mirena IUD in place with intermittent lower abdominal/pelvic pain presents for follow up for results. She was recently treated for UTI 1 week ago. She reports her pain is a little better but she still feels it, especially at night. She denies any other symptoms. OB History      Para Term  AB Living    0 0 0 0 0 0    SAB TAB Ectopic Molar Multiple Live Births    0 0 0  0         Obstetric Comments        Periods irregular, using Mirena  History of sexually transmitted infections no  Last pap 2017 NILM               PGyn    History   Sexual Activity    Sexual activity: Yes    Partners: Male    Birth control/ protection: IUD, None         Past Medical History:   Diagnosis Date    Migraine headache with aura        History reviewed. No pertinent surgical history. Allergies   Allergen Reactions    Latex, Natural Rubber Not Reported This Time    Lodine [Etodolac] Rash and Nausea and Vomiting    Ciprofloxacin Shortness of Breath       Current Outpatient Prescriptions on File Prior to Visit   Medication Sig Dispense Refill    levonorgestrel (MIRENA) 20 mcg/24 hr (5 years) IUD 1 Each by IntraUTERine route once.  doxycycline (VIBRAMYCIN) 100 mg capsule        No current facility-administered medications on file prior to visit. Review of Systems   Constitutional: Negative. Gastrointestinal: Negative. Genitourinary: Negative. Skin: Negative. Psychiatric/Behavioral: Negative.             Visit Vitals    /86    Pulse 79    Temp 98 °F (36.7 °C)    Ht 5' 8\" (1.727 m)    Wt 215 lb 3.2 oz (97.6 kg)    SpO2 97%    BMI 32.72 kg/m2       GENERAL:  Well developed, well nourished, in no distress    Ultrasound Pelvis: Transvaginal     INDICATION: Pelvic pain, IUD placement.     COMPARISON: None.     TECHNIQUE: Real-time transvaginal sonography in multiple planes was performed  with image documentation. Grayscale, color flow Doppler imaging, and velocity  spectral waveform analysis of the ovaries  was performed (duplex imaging).     FINDINGS:     UTERUS: Normal in size and echotexture measuring  7.6 x 4.0 x 3.0 cm. No  fibroids or masses identified.     ENDOMETRIUM: Normal in echotexture and thickness measuring  0.4 cm. Intrauterine  device appears adequately positioned within the midline endometrial canal of the  uterine body.     RIGHT OVARY and ADNEXA: Right ovary is normal in size and echotexture measuring   2.4 x 2.0 x 1.3 cm. No ovarian or adnexal masses identified. Color and spectral  Doppler demonstrate normal flow to the right ovary with no evidence of ovarian  torsion. The systolic and venous waveforms are within normal limits.     LEFT OVARY and ADNEXA: Left ovary is normal in size and echotexture measuring  3.7 x 3.0 x 2.8 cm. Functional left ovarian cyst measures 2.3 x 1.9 x 1.9 cm. Color and spectral Doppler demonstrate normal flow to the left ovary with no  evidence of ovarian torsion. The systolic and venous waveforms are within normal  limits. .     OTHER: No free fluid identified.     IMPRESSION  IMPRESSION:     1. Normal sonographic appearance of uterus with normally positioned, midline  intrauterine device. 2. Functional left ovarian cyst measures 2.3 cm. No ovarian torsion. No results found for this or any previous visit (from the past 24 hour(s)). 1. Pelvic pain  Reviewed ultrasound images and results with patient. No acute pathology except for 2cm left simple cyst. IUD in place. Infectious work-up negative. Pain mildly improved s/p tx for UTI. Discussed possible surgical management via diagnostic laparoscopy if pain persists or worsens. Pt stated she does not want surgery at this time. Will expectantly manage.      2. Cyst of left ovary  2cm simple cyst noted on ultrasound. Will repeat ultrasound in 3 months for resolution.   - US TRANSVAGINAL; Future     3. Acute cystitis without hematuria  S/p tx for UTI. Discussed repeating urine cx in 2 weeks for resolution, given patient's h/o pyelonephritis, and recurrent UTI's.        F/U 2-3 weeks

## 2018-10-19 ENCOUNTER — OFFICE VISIT (OUTPATIENT)
Dept: OBGYN CLINIC | Age: 23
End: 2018-10-19

## 2018-10-19 VITALS
HEART RATE: 82 BPM | SYSTOLIC BLOOD PRESSURE: 108 MMHG | RESPIRATION RATE: 18 BRPM | DIASTOLIC BLOOD PRESSURE: 73 MMHG | OXYGEN SATURATION: 98 % | BODY MASS INDEX: 32.71 KG/M2 | WEIGHT: 215.8 LBS | HEIGHT: 68 IN | TEMPERATURE: 97.2 F

## 2018-10-19 DIAGNOSIS — N39.0 RECURRENT UTI: Primary | ICD-10-CM

## 2018-10-19 NOTE — PROGRESS NOTES
Pt in today for labs only- urine culture,sample collected. Pt had not complaints or concerns at this time. Left office in stable condition.

## 2018-10-21 LAB — BACTERIA UR CULT: NORMAL

## 2019-01-17 ENCOUNTER — HOSPITAL ENCOUNTER (OUTPATIENT)
Dept: ULTRASOUND IMAGING | Age: 24
Discharge: HOME | End: 2019-01-17
Attending: OBSTETRICS & GYNECOLOGY
Payer: COMMERCIAL

## 2019-01-17 DIAGNOSIS — N83.202 CYST OF LEFT OVARY: ICD-10-CM

## 2019-01-17 PROCEDURE — 76830 TRANSVAGINAL US NON-OB: CPT

## 2019-01-21 ENCOUNTER — TELEPHONE (OUTPATIENT)
Dept: OBGYN CLINIC | Age: 24
End: 2019-01-21

## 2019-01-25 NOTE — TELEPHONE ENCOUNTER
Call made to the pt, no answer, unable to leave a message because the pt does not have a voicemail. Call made to see if the pt received the US results. Noted in her chart the provider called and spoke to her regarding the [address] [facility] on 1/17/19.

## 2019-09-14 ENCOUNTER — HOSPITAL ENCOUNTER (EMERGENCY)
Age: 24
Discharge: HOME | End: 2019-09-14
Attending: EMERGENCY MEDICINE
Payer: COMMERCIAL

## 2019-09-14 VITALS
WEIGHT: 225.53 LBS | SYSTOLIC BLOOD PRESSURE: 126 MMHG | OXYGEN SATURATION: 95 % | TEMPERATURE: 97.7 F | HEART RATE: 96 BPM | BODY MASS INDEX: 34.29 KG/M2 | RESPIRATION RATE: 17 BRPM | DIASTOLIC BLOOD PRESSURE: 85 MMHG

## 2019-09-14 DIAGNOSIS — F10.921 ALCOHOL INTOXICATION WITH DELIRIUM (HCC): Primary | ICD-10-CM

## 2019-09-14 LAB
ALBUMIN SERPL-MCNC: 3.7 G/DL (ref 3.5–5)
ALBUMIN/GLOB SERPL: 1 {RATIO} (ref 1.1–2.2)
ALP SERPL-CCNC: 97 U/L (ref 45–117)
ALT SERPL-CCNC: 59 U/L (ref 12–78)
ANION GAP SERPL CALC-SCNC: 10 MMOL/L (ref 5–15)
APAP SERPL-MCNC: <2 UG/ML (ref 10–30)
AST SERPL-CCNC: 38 U/L (ref 15–37)
BASOPHILS # BLD: 0.1 K/UL (ref 0–0.1)
BASOPHILS NFR BLD: 1 % (ref 0–1)
BILIRUB SERPL-MCNC: 0.2 MG/DL (ref 0.2–1)
BUN SERPL-MCNC: 12 MG/DL (ref 6–20)
BUN/CREAT SERPL: 13 (ref 12–20)
CALCIUM SERPL-MCNC: 8.8 MG/DL (ref 8.5–10.1)
CHLORIDE SERPL-SCNC: 109 MMOL/L (ref 97–108)
CO2 SERPL-SCNC: 27 MMOL/L (ref 21–32)
CREAT SERPL-MCNC: 0.96 MG/DL (ref 0.55–1.02)
DIFFERENTIAL METHOD BLD: NORMAL
EOSINOPHIL # BLD: 0 K/UL (ref 0–0.4)
EOSINOPHIL NFR BLD: 1 % (ref 0–7)
ERYTHROCYTE [DISTWIDTH] IN BLOOD BY AUTOMATED COUNT: 12.1 % (ref 11.5–14.5)
ETHANOL SERPL-MCNC: 253 MG/DL
GLOBULIN SER CALC-MCNC: 3.6 G/DL (ref 2–4)
GLUCOSE SERPL-MCNC: 140 MG/DL (ref 65–100)
HCT VFR BLD AUTO: 40.1 % (ref 35–47)
HGB BLD-MCNC: 13.1 G/DL (ref 11.5–16)
IMM GRANULOCYTES # BLD AUTO: 0 K/UL (ref 0–0.04)
IMM GRANULOCYTES NFR BLD AUTO: 0 % (ref 0–0.5)
LYMPHOCYTES # BLD: 2.3 K/UL (ref 0.8–3.5)
LYMPHOCYTES NFR BLD: 31 % (ref 12–49)
MCH RBC QN AUTO: 30.9 PG (ref 26–34)
MCHC RBC AUTO-ENTMCNC: 32.7 G/DL (ref 30–36.5)
MCV RBC AUTO: 94.6 FL (ref 80–99)
MONOCYTES # BLD: 0.4 K/UL (ref 0–1)
MONOCYTES NFR BLD: 5 % (ref 5–13)
NEUTS SEG # BLD: 4.6 K/UL (ref 1.8–8)
NEUTS SEG NFR BLD: 62 % (ref 32–75)
NRBC # BLD: 0 K/UL (ref 0–0.01)
NRBC BLD-RTO: 0 PER 100 WBC
PLATELET # BLD AUTO: 316 K/UL (ref 150–400)
PMV BLD AUTO: 9.4 FL (ref 8.9–12.9)
POTASSIUM SERPL-SCNC: 4.7 MMOL/L (ref 3.5–5.1)
PROT SERPL-MCNC: 7.3 G/DL (ref 6.4–8.2)
RBC # BLD AUTO: 4.24 M/UL (ref 3.8–5.2)
SALICYLATES SERPL-MCNC: 1.8 MG/DL (ref 2.8–20)
SODIUM SERPL-SCNC: 146 MMOL/L (ref 136–145)
WBC # BLD AUTO: 7.4 K/UL (ref 3.6–11)

## 2019-09-14 PROCEDURE — 80053 COMPREHEN METABOLIC PANEL: CPT

## 2019-09-14 PROCEDURE — 36415 COLL VENOUS BLD VENIPUNCTURE: CPT

## 2019-09-14 PROCEDURE — 99285 EMERGENCY DEPT VISIT HI MDM: CPT

## 2019-09-14 PROCEDURE — 80307 DRUG TEST PRSMV CHEM ANLYZR: CPT

## 2019-09-14 PROCEDURE — 85025 COMPLETE CBC W/AUTO DIFF WBC: CPT

## 2019-09-14 RX ORDER — ACETAMINOPHEN 325 MG/1
325 TABLET ORAL
Status: DISCONTINUED | OUTPATIENT
Start: 2019-09-14 | End: 2019-09-14

## 2019-09-14 NOTE — ED NOTES
Pt alert and oriented, ambulating in hallway independently with steady gait. Pt reports she is ready to be discharged. Provider aware.

## 2019-09-14 NOTE — DISCHARGE INSTRUCTIONS
Patient Education        Acute Alcohol Intoxication: Care Instructions  Your Care Instructions    You have had treatment to help your body rid itself of alcohol. Too much alcohol upsets the body's fluid balance. Your doctor may have given you fluids and vitamins. For some people, drinking too much alcohol is a one-time event. For others, it is an ongoing problem. In either case, it is serious. It can be life-threatening. Follow-up care is a key part of your treatment and safety. Be sure to make and go to all appointments, and call your doctor if you are having problems. It's also a good idea to know your test results and keep a list of the medicines you take. How can you care for yourself at home? · Do not drink and drive. · Be safe with medicines. Take your medicines exactly as prescribed. Call your doctor if you think you are having a problem with your medicine. · Your doctor may have prescribed disulfiram (Antabuse). Do not drink any alcohol while you are taking this medicine. You may have severe or even life-threatening side effects from even small amounts of alcohol. · If you were given medicine to prevent nausea, be sure to take it exactly as prescribed. · Before you take any medicine, tell your doctor if:  ? You have had a bad reaction to any medicines in the past.  ? You are taking other medicines, including over-the-counter ones, or have other health problems. ? You are or could be pregnant. · Be prepared to have some symptoms of withdrawal in the next few days. · Drink plenty of liquids in the next few days. · Seek help if you need it to stop drinking. Getting counseling and joining a support group can help you stay sober. Try a support group such as Alcoholics Anonymous. · Avoid alcohol when you take medicines. It can react with many medicines and cause serious problems. When should you call for help? Call 911 anytime you think you may need emergency care.  For example, call if:    · You feel confused and are seeing things that are not there.     · You are thinking about killing yourself or hurting others.     · You have a seizure.     · You vomit blood or what looks like coffee grounds.    Call your doctor now or seek immediate medical care if:    · You have trembling, restlessness, sweating, and other withdrawal symptoms that are new or that get worse.     · Your withdrawal symptoms come back after not bothering you for days or weeks.     · You can't stop vomiting.    Watch closely for changes in your health, and be sure to contact your doctor if:    · You need help to stop drinking. Where can you learn more? Go to http://fabian-zeny.info/. Enter T102 in the search box to learn more about \"Acute Alcohol Intoxication: Care Instructions. \"  Current as of: February 5, 2019  Content Version: 12.1  © 1536-3290 [employer]. Care instructions adapted under license by [employer] (which disclaims liability or warranty for this information). If you have questions about a medical condition or this instruction, always ask your healthcare professional. [address] any warranty or liability for your use of this information.
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
## 2019-09-14 NOTE — ED NOTES
Pt resting contently in room, in no acute distress, and with family and fiance at bedside. Family updated on plan of care.

## 2019-09-14 NOTE — ED NOTES
Pt resting contently in room, in no acute distress, with fiance at bedside. Fiance updated on plan of care.

## 2019-09-14 NOTE — ED NOTES
Discharge instructions were given to the patient by Phil Sandoval RN. The patient left the Emergency Department ambulatory, alert and oriented and in no acute distress with 0 prescriptions. The patient was encouraged to call or return to the ED for worsening issues or problems and was encouraged to schedule a follow up appointment for continuing care. The patient verbalized understanding of discharge instructions and prescriptions, all questions were answered. The patient has no further concerns at this time.
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
